# Patient Record
Sex: FEMALE | Race: WHITE | Employment: UNEMPLOYED | URBAN - NONMETROPOLITAN AREA
[De-identification: names, ages, dates, MRNs, and addresses within clinical notes are randomized per-mention and may not be internally consistent; named-entity substitution may affect disease eponyms.]

---

## 2017-10-13 ENCOUNTER — HOSPITAL ENCOUNTER (EMERGENCY)
Age: 42
Discharge: HOME | End: 2017-10-13
Payer: COMMERCIAL

## 2017-10-13 VITALS
RESPIRATION RATE: 16 BRPM | OXYGEN SATURATION: 99 % | HEART RATE: 77 BPM | BODY MASS INDEX: 21.23 KG/M2 | SYSTOLIC BLOOD PRESSURE: 125 MMHG | HEIGHT: 65 IN | DIASTOLIC BLOOD PRESSURE: 67 MMHG | TEMPERATURE: 98.5 F | WEIGHT: 127.4 LBS

## 2017-10-13 DIAGNOSIS — K11.20 PAROTITIS: ICD-10-CM

## 2017-10-13 DIAGNOSIS — R13.10 DYSPHAGIA, UNSPECIFIED TYPE: Primary | ICD-10-CM

## 2017-10-13 PROCEDURE — 99202 OFFICE O/P NEW SF 15 MIN: CPT | Performed by: NURSE PRACTITIONER

## 2017-10-13 PROCEDURE — 99215 OFFICE O/P EST HI 40 MIN: CPT

## 2017-10-13 PROCEDURE — 99283 EMERGENCY DEPT VISIT LOW MDM: CPT

## 2017-10-13 ASSESSMENT — ENCOUNTER SYMPTOMS
RHINORRHEA: 0
CHOKING: 0
NAUSEA: 0
EYE PAIN: 0
SINUS PAIN: 0
COUGH: 0
VOMITING: 0
BACK PAIN: 0
CHEST TIGHTNESS: 0
WHEEZING: 0
SHORTNESS OF BREATH: 0
SORE THROAT: 1
ABDOMINAL PAIN: 0
STRIDOR: 0
EYE DISCHARGE: 0
SINUS PRESSURE: 0
TROUBLE SWALLOWING: 1
FACIAL SWELLING: 0
DIARRHEA: 0
VOICE CHANGE: 0
RHINORRHEA: 1
APNEA: 0

## 2017-10-13 ASSESSMENT — PAIN SCALES - GENERAL
PAINLEVEL_OUTOF10: 8
PAINLEVEL_OUTOF10: 0

## 2017-10-13 ASSESSMENT — PAIN DESCRIPTION - ORIENTATION: ORIENTATION: LEFT

## 2017-10-13 ASSESSMENT — PAIN DESCRIPTION - PAIN TYPE: TYPE: ACUTE PAIN

## 2017-10-13 ASSESSMENT — PAIN DESCRIPTION - FREQUENCY: FREQUENCY: INTERMITTENT

## 2017-10-13 ASSESSMENT — PAIN DESCRIPTION - LOCATION: LOCATION: NECK

## 2017-10-13 NOTE — ED PROVIDER NOTES
[facility]  eMERGENCY dEPARTMENT eNCOUnter          CHIEF COMPLAINT       Chief Complaint   Patient presents with    Neck Pain     left side       Nurses Notes reviewed and I agree except as noted in the HPI. HISTORY OF PRESENT ILLNESS    Juanjo Villasenor is a 46 y.o. female who presents to the Emergency Department for the evaluation of neck pain. The patient states that she was laying in bed about 2 hours ago and all of sudden felt neck pain. She states that it hurt to swallow, talk, or even open her mouth wide. She states that the pain progressively got worse an caused her to have a headaches. She states that she has never experienced anything like this before. She states that it felt like something was stuck in her throat. She states that all of her symptoms have completely gone away and all she is experiencing is neck soreness. She denies weakness in face, tingling in face, vision changes, emesis, fever, shortness of breath, or any other symptoms at this time. The HPI was provided by the patient. REVIEW OF SYSTEMS     Review of Systems   Constitutional: Negative for appetite change, chills, fatigue and fever. HENT: Positive for ear pain, sore throat and trouble swallowing. Negative for congestion and rhinorrhea. Eyes: Negative for pain, discharge and visual disturbance. Respiratory: Negative for cough, shortness of breath and wheezing. Cardiovascular: Negative for chest pain, palpitations and leg swelling. Gastrointestinal: Negative for abdominal pain, diarrhea and vomiting. Genitourinary: Negative for difficulty urinating, dysuria and vaginal discharge. Musculoskeletal: Positive for neck pain. Negative for arthralgias, back pain and joint swelling. Skin: Negative for pallor and rash. Neurological: Positive for headaches. Negative for dizziness, syncope, weakness and light-headedness. Hematological: Negative for adenopathy.    Psychiatric/Behavioral: Negative for confusion, dysphoric mood and suicidal ideas. The patient is not nervous/anxious. PAST MEDICAL HISTORY    has a past medical history of Depression; Insomnia; Insomnia; and Weight loss. SURGICAL HISTORY      has a past surgical history that includes Tubal ligation; debbie and bso (cervix removed); other surgical history; and Dilation and curettage of uterus (4/29/16). CURRENT MEDICATIONS       Current Discharge Medication List      CONTINUE these medications which have NOT CHANGED    Details   ROPINIRole HCl (REQUIP PO) Take by mouth      citalopram (CELEXA) 40 MG tablet Take 40 mg by mouth daily      ibuprofen (ADVIL;MOTRIN) 600 MG tablet Take 1 tablet by mouth every 6 hours as needed for Pain  Qty: 60 tablet, Refills: 1             ALLERGIES     has No Known Allergies. FAMILY HISTORY     indicated that her mother is alive. She indicated that her father is alive. family history includes Diabetes in her mother. SOCIAL HISTORY      reports that she has been smoking Cigarettes. She has a 25.00 pack-year smoking history. She has never used smokeless tobacco. She reports that she drinks about 3.6 oz of alcohol per week . She reports that she uses drugs, including Marijuana. PHYSICAL EXAM     INITIAL VITALS:  height is 5' 5\" (1.651 m) and weight is 127 lb 6.4 oz (57.8 kg). Her oral temperature is 98.5 °F (36.9 °C). Her blood pressure is 125/67 and her pulse is 77. Her respiration is 16 and oxygen saturation is 99%. Physical Exam   Constitutional: She is oriented to person, place, and time. She appears well-developed and well-nourished. HENT:   Head: Normocephalic and atraumatic. Right Ear: External ear normal.   Left Ear: External ear normal.   Eyes: Conjunctivae are normal. Right eye exhibits no discharge. Left eye exhibits no discharge. No scleral icterus. Neck: Normal range of motion. Neck supple. No JVD present. Left carotid tenderness adjacent to the trachea.

## 2017-10-13 NOTE — ED NOTES
A Rena CNP calls report to LUISITO Rojas RN at [phone] regarding transfer     Shivam Biswas, CRISTOPHER  10/13/17 0155

## 2017-10-13 NOTE — ED PROVIDER NOTES
other surgical history; and Dilation and curettage of uterus (4/29/16). CURRENT MEDICATIONS       Current Discharge Medication List      CONTINUE these medications which have NOT CHANGED    Details   ROPINIRole HCl (REQUIP PO) Take by mouth      citalopram (CELEXA) 40 MG tablet Take 40 mg by mouth daily      ibuprofen (ADVIL;MOTRIN) 600 MG tablet Take 1 tablet by mouth every 6 hours as needed for Pain  Qty: 60 tablet, Refills: 1             ALLERGIES     Patient is has No Known Allergies. FAMILY HISTORY     Patient's family history includes Diabetes in her mother. SOCIAL HISTORY     Patient  reports that she has been smoking Cigarettes. She has a 25.00 pack-year smoking history. She has never used smokeless tobacco. She reports that she drinks about 3.6 oz of alcohol per week . She reports that she uses drugs, including Marijuana. PHYSICAL EXAM     ED TRIAGE VITALS  BP: 130/68, Temp: 98.6 °F (37 °C), Pulse: 83, Resp: 16, SpO2: 98 %  Physical Exam   Constitutional: She is oriented to person, place, and time. Vital signs are normal. She appears well-developed and well-nourished. Non-toxic appearance. She does not have a sickly appearance. She does not appear ill. No distress. HENT:   Head: Normocephalic and atraumatic. Right Ear: Hearing and external ear normal.   Left Ear: Hearing and external ear normal.   Nose: Nose normal.   Mouth/Throat: Uvula is midline and mucous membranes are normal. No oral lesions. There is trismus (mild) in the jaw. Normal dentition. No dental abscesses, uvula swelling or dental caries. Posterior oropharyngeal erythema present. No oropharyngeal exudate, posterior oropharyngeal edema or tonsillar abscesses. Neck: Trachea normal, normal range of motion, full passive range of motion without pain and phonation normal. Neck supple. No spinous process tenderness and no muscular tenderness present. No neck rigidity.  No tracheal deviation, no edema, no erythema and normal range of motion present. No thyromegaly present. Cardiovascular: Normal rate, regular rhythm, S1 normal, S2 normal and normal heart sounds. No murmur heard. Pulmonary/Chest: Effort normal and breath sounds normal. No accessory muscle usage or stridor. No respiratory distress. She has no decreased breath sounds. She has no wheezes. She has no rhonchi. She has no rales. Lymphadenopathy:        Head (right side): No submental, no submandibular, no tonsillar, no preauricular, no posterior auricular and no occipital adenopathy present. Head (left side): No submental, no submandibular, no tonsillar, no preauricular, no posterior auricular and no occipital adenopathy present. She has cervical adenopathy. Right cervical: No superficial cervical, no deep cervical and no posterior cervical adenopathy present. Left cervical: Deep cervical adenopathy present. No superficial cervical and no posterior cervical adenopathy present. Neurological: She is alert and oriented to person, place, and time. Skin: Skin is warm, dry and intact. No rash noted. She is not diaphoretic. No cyanosis. No pallor. Nursing note and vitals reviewed. DIAGNOSTIC RESULTS   Labs:No results found for this visit on 10/13/17. IMAGING:    URGENT CARE COURSE:     Vitals:    10/13/17 1403   BP: 130/68   Pulse: 83   Resp: 16   Temp: 98.6 °F (37 °C)   TempSrc: Temporal   SpO2: 98%   Weight: 127 lb 6.4 oz (57.8 kg)   Height: 5' 5\" (1.651 m)       Medications - No data to display  PROCEDURES:  None  FINAL IMPRESSION      1. Dysphagia, unspecified type        DISPOSITION/PLAN   DISPOSITION Decision to Transfer   Called report to [facility] at [facility] ER  Patient transferred by personal car, S.O taking for further evalouation  Acute onset 20 minutes of sore throat, left sided, left cervial adenopathy, severe pain, mild trismus, not wanting to swallow salivia due to pain.     No abscess, stones noted due oral cavity  Afebrile, nontoxic in appearance  Oral airway patient, own airway    PATIENT REFERRED TO:  [facility] EMERGENCY DEPT  [place] [id number] [phone]  Today        DISCHARGE MEDICATIONS:  Current Discharge Medication List        Current Discharge Medication List            Jessica Ackerman, [address] [place] [address], [facility]  10/13/17 7773

## 2019-07-20 ENCOUNTER — HOSPITAL ENCOUNTER (EMERGENCY)
Dept: GENERAL RADIOLOGY | Age: 44
Discharge: HOME | End: 2019-07-20

## 2019-07-20 ENCOUNTER — HOSPITAL ENCOUNTER (EMERGENCY)
Age: 44
Discharge: HOME | End: 2019-07-20
Payer: COMMERCIAL

## 2019-07-20 VITALS
TEMPERATURE: 97.9 F | HEIGHT: 65 IN | SYSTOLIC BLOOD PRESSURE: 111 MMHG | RESPIRATION RATE: 16 BRPM | HEART RATE: 90 BPM | WEIGHT: 114 LBS | DIASTOLIC BLOOD PRESSURE: 54 MMHG | OXYGEN SATURATION: 97 % | BODY MASS INDEX: 18.99 KG/M2

## 2019-07-20 DIAGNOSIS — M94.0 COSTOCHONDRITIS: ICD-10-CM

## 2019-07-20 DIAGNOSIS — M54.50 ACUTE RIGHT-SIDED LOW BACK PAIN WITHOUT SCIATICA: Primary | ICD-10-CM

## 2019-07-20 PROCEDURE — 99213 OFFICE O/P EST LOW 20 MIN: CPT

## 2019-07-20 PROCEDURE — 71046 X-RAY EXAM CHEST 2 VIEWS: CPT

## 2019-07-20 PROCEDURE — 99214 OFFICE O/P EST MOD 30 MIN: CPT | Performed by: NURSE PRACTITIONER

## 2019-07-20 RX ORDER — METHYLPREDNISOLONE 4 MG/1
TABLET ORAL
Qty: 1 KIT | Refills: 0 | Status: SHIPPED | OUTPATIENT
Start: 2019-07-20 | End: 2019-07-26

## 2019-07-20 RX ORDER — CYCLOBENZAPRINE HCL 10 MG
10 TABLET ORAL 3 TIMES DAILY PRN
Qty: 30 TABLET | Refills: 0 | Status: SHIPPED | OUTPATIENT
Start: 2019-07-20 | End: 2019-07-30

## 2019-07-20 ASSESSMENT — PAIN DESCRIPTION - ORIENTATION: ORIENTATION: UPPER;RIGHT

## 2019-07-20 ASSESSMENT — ENCOUNTER SYMPTOMS
SHORTNESS OF BREATH: 0
CHEST TIGHTNESS: 0
EYE ITCHING: 0
WHEEZING: 0
GASTROINTESTINAL NEGATIVE: 1

## 2019-07-20 ASSESSMENT — PAIN - FUNCTIONAL ASSESSMENT: PAIN_FUNCTIONAL_ASSESSMENT: ACTIVITIES ARE NOT PREVENTED

## 2019-07-20 ASSESSMENT — PAIN DESCRIPTION - PAIN TYPE: TYPE: ACUTE PAIN

## 2019-07-20 ASSESSMENT — PAIN SCALES - GENERAL: PAINLEVEL_OUTOF10: 8

## 2019-07-20 ASSESSMENT — PAIN DESCRIPTION - LOCATION: LOCATION: BACK

## 2019-07-20 ASSESSMENT — PAIN DESCRIPTION - DESCRIPTORS: DESCRIPTORS: DISCOMFORT

## 2019-07-20 NOTE — ED PROVIDER NOTES
Patient is has No Known Allergies. FAMILY HISTORY     Patient'sfamily history includes Diabetes in her mother. SOCIAL HISTORY     Patient  reports that she has been smoking cigarettes. She has a 25.00 pack-year smoking history. She has never used smokeless tobacco. She reports that she drinks about 6.0 standard drinks of alcohol per week. She reports that she has current or past drug history. Drug: Marijuana. PHYSICAL EXAM     ED TRIAGE VITALS  BP: (!) 111/54, Temp: 97.9 °F (36.6 °C), Pulse: 90, Resp: 16, SpO2: 97 %  Physical Exam   Constitutional: She is oriented to person, place, and time. She appears well-developed and well-nourished. No distress. HENT:   Head: Normocephalic. Nose: Nose normal.   Mouth/Throat: Oropharynx is clear and moist.   Eyes: Right eye exhibits no discharge. Left eye exhibits no discharge. No scleral icterus. Neck: Normal range of motion. Cardiovascular: Normal rate, regular rhythm, normal heart sounds and intact distal pulses. Pulmonary/Chest: Effort normal. She has decreased breath sounds in the right middle field and the right lower field. She has no wheezes. She has no rales. Abdominal: Soft. Bowel sounds are normal. She exhibits no distension. There is no tenderness. Musculoskeletal: Normal range of motion. She exhibits no edema or tenderness. Lymphadenopathy:     She has no cervical adenopathy. Neurological: She is alert and oriented to person, place, and time. Skin: Skin is warm and dry. Capillary refill takes less than 2 seconds. No rash noted. No erythema. Psychiatric: She has a normal mood and affect. Her behavior is normal. Judgment and thought content normal.   Nursing note and vitals reviewed. DIAGNOSTIC RESULTS   Labs: No results found for this visit on 07/20/19. IMAGING:  XR CHEST STANDARD (2 VW)   Final Result   1.  Unremarkable PA and lateral views of the chest.            **This report has been created using voice recognition

## 2019-07-20 NOTE — ED NOTES
All discharge education and information given. Pt instructed to go to ED for any increase in back pain. Verbalized Understanding. Left stable.      Lloyd Caba, CRISTOPHER  07/20/19 [id number]

## 2021-07-09 ENCOUNTER — HOSPITAL ENCOUNTER (OUTPATIENT)
Age: 46
Setting detail: SPECIMEN
Discharge: HOME | End: 2021-07-09

## 2021-07-09 LAB
ABSOLUTE EOS #: 0.1 K/UL (ref 0–0.44)
ABSOLUTE IMMATURE GRANULOCYTE: 0.03 K/UL (ref 0–0.3)
ABSOLUTE LYMPH #: 2.14 K/UL (ref 1.1–3.7)
ABSOLUTE MONO #: 0.71 K/UL (ref 0.1–1.2)
ALT SERPL-CCNC: 7 U/L (ref 5–33)
ANION GAP SERPL CALCULATED.3IONS-SCNC: 10 MMOL/L (ref 9–17)
AST SERPL-CCNC: 13 U/L
BASOPHILS # BLD: 1 % (ref 0–2)
BASOPHILS ABSOLUTE: 0.09 K/UL (ref 0–0.2)
BUN BLDV-MCNC: 5 MG/DL (ref 6–20)
BUN/CREAT BLD: ABNORMAL (ref 9–20)
CALCIUM SERPL-MCNC: 8.9 MG/DL (ref 8.6–10.4)
CHLORIDE BLD-SCNC: 105 MMOL/L (ref 98–107)
CHOLESTEROL/HDL RATIO: 2.4
CHOLESTEROL: 177 MG/DL
CO2: 26 MMOL/L (ref 20–31)
CREAT SERPL-MCNC: 0.66 MG/DL (ref 0.5–0.9)
DIFFERENTIAL TYPE: ABNORMAL
EOSINOPHILS RELATIVE PERCENT: 1 % (ref 1–4)
GFR AFRICAN AMERICAN: >60 ML/MIN
GFR NON-AFRICAN AMERICAN: >60 ML/MIN
GFR SERPL CREATININE-BSD FRML MDRD: ABNORMAL ML/MIN/{1.73_M2}
GFR SERPL CREATININE-BSD FRML MDRD: ABNORMAL ML/MIN/{1.73_M2}
GLUCOSE BLD-MCNC: 103 MG/DL (ref 70–99)
HCT VFR BLD CALC: 40.3 % (ref 36.3–47.1)
HDLC SERPL-MCNC: 75 MG/DL
HEMOGLOBIN: 11.8 G/DL (ref 11.9–15.1)
IMMATURE GRANULOCYTES: 0 %
LDL CHOLESTEROL: 88 MG/DL (ref 0–130)
LYMPHOCYTES # BLD: 27 % (ref 24–43)
MCH RBC QN AUTO: 28.1 PG (ref 25.2–33.5)
MCHC RBC AUTO-ENTMCNC: 29.3 G/DL (ref 28.4–34.8)
MCV RBC AUTO: 96 FL (ref 82.6–102.9)
MONOCYTES # BLD: 9 % (ref 3–12)
NRBC AUTOMATED: 0 PER 100 WBC
PDW BLD-RTO: 16.4 % (ref 11.8–14.4)
PLATELET # BLD: 374 K/UL (ref 138–453)
PLATELET ESTIMATE: ABNORMAL
PMV BLD AUTO: 11.2 FL (ref 8.1–13.5)
POTASSIUM SERPL-SCNC: 4 MMOL/L (ref 3.7–5.3)
RBC # BLD: 4.2 M/UL (ref 3.95–5.11)
RBC # BLD: ABNORMAL 10*6/UL
SEG NEUTROPHILS: 62 % (ref 36–65)
SEGMENTED NEUTROPHILS ABSOLUTE COUNT: 4.75 K/UL (ref 1.5–8.1)
SODIUM BLD-SCNC: 141 MMOL/L (ref 135–144)
TRIGL SERPL-MCNC: 72 MG/DL
TSH SERPL DL<=0.05 MIU/L-ACNC: 0.77 MIU/L (ref 0.3–5)
VLDLC SERPL CALC-MCNC: NORMAL MG/DL (ref 1–30)
WBC # BLD: 7.8 K/UL (ref 3.5–11.3)
WBC # BLD: ABNORMAL 10*3/UL

## 2022-04-13 ENCOUNTER — HOSPITAL ENCOUNTER (INPATIENT)
Age: 47
LOS: 4 days | Discharge: HOME | DRG: 751 | End: 2022-04-18
Attending: PSYCHIATRY & NEUROLOGY | Admitting: PSYCHIATRY & NEUROLOGY
Payer: MEDICAID

## 2022-04-13 DIAGNOSIS — S61.519A SELF-INFLICTED LACERATION OF WRIST, INITIAL ENCOUNTER (HCC): ICD-10-CM

## 2022-04-13 DIAGNOSIS — R45.851 DEPRESSION WITH SUICIDAL IDEATION: Primary | ICD-10-CM
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
## 2022-04-13 DIAGNOSIS — F32.A DEPRESSION WITH SUICIDAL IDEATION: Primary | ICD-10-CM

## 2022-04-13 DIAGNOSIS — F10.920 ACUTE ALCOHOLIC INTOXICATION WITHOUT COMPLICATION (HCC): ICD-10-CM

## 2022-04-13 LAB
ACETAMINOPHEN LEVEL: < 5 UG/ML (ref 0–20)
ALBUMIN SERPL-MCNC: 4.1 G/DL (ref 3.5–5.1)
ALP BLD-CCNC: 95 U/L (ref 38–126)
ALT SERPL-CCNC: 8 U/L (ref 11–66)
AMPHETAMINE+METHAMPHETAMINE URINE SCREEN: NEGATIVE
ANION GAP SERPL CALCULATED.3IONS-SCNC: 14 MEQ/L (ref 8–16)
AST SERPL-CCNC: 16 U/L (ref 5–40)
BACTERIA: ABNORMAL /HPF
BARBITURATE QUANTITATIVE URINE: NEGATIVE
BASOPHILS # BLD: 2 %
BASOPHILS ABSOLUTE: 0.2 THOU/MM3 (ref 0–0.1)
BENZODIAZEPINE QUANTITATIVE URINE: NEGATIVE
BILIRUB SERPL-MCNC: 0.2 MG/DL (ref 0.3–1.2)
BILIRUBIN DIRECT: < 0.2 MG/DL (ref 0–0.3)
BILIRUBIN URINE: NEGATIVE
BLOOD, URINE: NEGATIVE
BUN BLDV-MCNC: 7 MG/DL (ref 7–22)
CALCIUM SERPL-MCNC: 8.5 MG/DL (ref 8.5–10.5)
CANNABINOID QUANTITATIVE URINE: POSITIVE
CASTS 2: ABNORMAL /LPF
CASTS UA: ABNORMAL /LPF
CHARACTER, URINE: CLEAR
CHLORIDE BLD-SCNC: 105 MEQ/L (ref 98–111)
CO2: 24 MEQ/L (ref 23–33)
COCAINE METABOLITE QUANTITATIVE URINE: NEGATIVE
COLOR: YELLOW
CREAT SERPL-MCNC: 0.7 MG/DL (ref 0.4–1.2)
CRYSTALS, UA: ABNORMAL
EOSINOPHIL # BLD: 1.6 %
EOSINOPHILS ABSOLUTE: 0.1 THOU/MM3 (ref 0–0.4)
EPITHELIAL CELLS, UA: ABNORMAL /HPF
ERYTHROCYTE [DISTWIDTH] IN BLOOD BY AUTOMATED COUNT: 16.8 % (ref 11.5–14.5)
ERYTHROCYTE [DISTWIDTH] IN BLOOD BY AUTOMATED COUNT: 58.1 FL (ref 35–45)
ETHYL ALCOHOL, SERUM: 0.21 %
GFR SERPL CREATININE-BSD FRML MDRD: > 90 ML/MIN/1.73M2
GLUCOSE BLD-MCNC: 113 MG/DL (ref 70–108)
GLUCOSE URINE: NEGATIVE MG/DL
HCT VFR BLD CALC: 45.6 % (ref 37–47)
HEMOGLOBIN: 14.3 GM/DL (ref 12–16)
IMMATURE GRANS (ABS): 0.03 THOU/MM3 (ref 0–0.07)
IMMATURE GRANULOCYTES: 0.4 %
KETONES, URINE: NEGATIVE
LEUKOCYTE ESTERASE, URINE: NEGATIVE
LYMPHOCYTES # BLD: 30.8 %
LYMPHOCYTES ABSOLUTE: 2.3 THOU/MM3 (ref 1–4.8)
MCH RBC QN AUTO: 29.3 PG (ref 26–33)
MCHC RBC AUTO-ENTMCNC: 31.4 GM/DL (ref 32.2–35.5)
MCV RBC AUTO: 93.4 FL (ref 81–99)
MISCELLANEOUS 2: ABNORMAL
MONOCYTES # BLD: 6.7 %
MONOCYTES ABSOLUTE: 0.5 THOU/MM3 (ref 0.4–1.3)
NITRITE, URINE: NEGATIVE
NUCLEATED RED BLOOD CELLS: 0 /100 WBC
OPIATES, URINE: NEGATIVE
OSMOLALITY CALCULATION: 283.8 MOSMOL/KG (ref 275–300)
OXYCODONE: NEGATIVE
PH UA: 6 (ref 5–9)
PHENCYCLIDINE QUANTITATIVE URINE: NEGATIVE
PLATELET # BLD: 437 THOU/MM3 (ref 130–400)
PMV BLD AUTO: 10.1 FL (ref 9.4–12.4)
POTASSIUM SERPL-SCNC: 3.6 MEQ/L (ref 3.5–5.2)
PREGNANCY, SERUM: NEGATIVE
PROTEIN UA: 30
RBC # BLD: 4.88 MILL/MM3 (ref 4.2–5.4)
RBC URINE: ABNORMAL /HPF
RENAL EPITHELIAL, UA: ABNORMAL
SALICYLATE, SERUM: < 0.3 MG/DL (ref 2–10)
SARS-COV-2, NAAT: NOT  DETECTED
SEG NEUTROPHILS: 58.5 %
SEGMENTED NEUTROPHILS ABSOLUTE COUNT: 4.4 THOU/MM3 (ref 1.8–7.7)
SODIUM BLD-SCNC: 143 MEQ/L (ref 135–145)
SPECIFIC GRAVITY, URINE: 1 (ref 1–1.03)
TOTAL PROTEIN: 6.8 G/DL (ref 6.1–8)
TSH SERPL DL<=0.05 MIU/L-ACNC: 2.82 UIU/ML (ref 0.4–4.2)
UROBILINOGEN, URINE: 0.2 EU/DL (ref 0–1)
WBC # BLD: 7.5 THOU/MM3 (ref 4.8–10.8)
WBC UA: ABNORMAL /HPF
YEAST: ABNORMAL

## 2022-04-13 PROCEDURE — 84443 ASSAY THYROID STIM HORMONE: CPT

## 2022-04-13 PROCEDURE — 82077 ASSAY SPEC XCP UR&BREATH IA: CPT

## 2022-04-13 PROCEDURE — 87635 SARS-COV-2 COVID-19 AMP PRB: CPT

## 2022-04-13 PROCEDURE — 80179 DRUG ASSAY SALICYLATE: CPT

## 2022-04-13 PROCEDURE — 99285 EMERGENCY DEPT VISIT HI MDM: CPT

## 2022-04-13 PROCEDURE — 84703 CHORIONIC GONADOTROPIN ASSAY: CPT

## 2022-04-13 PROCEDURE — 36415 COLL VENOUS BLD VENIPUNCTURE: CPT

## 2022-04-13 PROCEDURE — 80143 DRUG ASSAY ACETAMINOPHEN: CPT

## 2022-04-13 PROCEDURE — 82248 BILIRUBIN DIRECT: CPT

## 2022-04-13 PROCEDURE — 81001 URINALYSIS AUTO W/SCOPE: CPT

## 2022-04-13 PROCEDURE — 85025 COMPLETE CBC W/AUTO DIFF WBC: CPT

## 2022-04-13 PROCEDURE — 80053 COMPREHEN METABOLIC PANEL: CPT

## 2022-04-13 PROCEDURE — 80307 DRUG TEST PRSMV CHEM ANLYZR: CPT

## 2022-04-13 ASSESSMENT — SLEEP AND FATIGUE QUESTIONNAIRES
AVERAGE NUMBER OF SLEEP HOURS: 2
DO YOU USE A SLEEP AID: YES
DIFFICULTY FALLING ASLEEP: YES
DO YOU HAVE DIFFICULTY SLEEPING: YES
RESTFUL SLEEP: NO
DIFFICULTY ARISING: YES
SLEEP PATTERN: INSOMNIA
DIFFICULTY STAYING ASLEEP: YES

## 2022-04-14 PROBLEM — F32.A DEPRESSION WITH SUICIDAL IDEATION: Status: ACTIVE | Noted: 2022-04-14

## 2022-04-14 PROBLEM — F33.9 MAJOR DEPRESSION, RECURRENT (HCC): Status: ACTIVE | Noted: 2022-04-14

## 2022-04-14 PROBLEM — R45.851 DEPRESSION WITH SUICIDAL IDEATION: Status: ACTIVE | Noted: 2022-04-14

## 2022-04-14 LAB — ETHYL ALCOHOL, SERUM: < 0.01 %

## 2022-04-14 PROCEDURE — APPSS30 APP SPLIT SHARED TIME 16-30 MINUTES: Performed by: PHYSICIAN ASSISTANT

## 2022-04-14 PROCEDURE — 1240000000 HC EMOTIONAL WELLNESS R&B

## 2022-04-14 PROCEDURE — 6370000000 HC RX 637 (ALT 250 FOR IP): Performed by: PSYCHIATRY & NEUROLOGY

## 2022-04-14 PROCEDURE — 82077 ASSAY SPEC XCP UR&BREATH IA: CPT

## 2022-04-14 PROCEDURE — 90792 PSYCH DIAG EVAL W/MED SRVCS: CPT | Performed by: PSYCHIATRY & NEUROLOGY

## 2022-04-14 PROCEDURE — 6370000000 HC RX 637 (ALT 250 FOR IP): Performed by: PHYSICIAN ASSISTANT

## 2022-04-14 RX ORDER — ATOMOXETINE 25 MG/1
25 CAPSULE ORAL DAILY
COMMUNITY

## 2022-04-14 RX ORDER — MAGNESIUM HYDROXIDE/ALUMINUM HYDROXICE/SIMETHICONE 120; 1200; 1200 MG/30ML; MG/30ML; MG/30ML
30 SUSPENSION ORAL EVERY 6 HOURS PRN
Status: DISCONTINUED | OUTPATIENT
Start: 2022-04-14 | End: 2022-04-18 | Stop reason: HOSPADM

## 2022-04-14 RX ORDER — ATOMOXETINE 25 MG/1
25 CAPSULE ORAL DAILY
Status: DISCONTINUED | OUTPATIENT
Start: 2022-04-14 | End: 2022-04-18 | Stop reason: HOSPADM

## 2022-04-14 RX ORDER — ACETAMINOPHEN 325 MG/1
650 TABLET ORAL EVERY 4 HOURS PRN
Status: DISCONTINUED | OUTPATIENT
Start: 2022-04-14 | End: 2022-04-18 | Stop reason: HOSPADM

## 2022-04-14 RX ORDER — OXYBUTYNIN CHLORIDE 10 MG/1
10 TABLET, EXTENDED RELEASE ORAL DAILY
COMMUNITY

## 2022-04-14 RX ORDER — CHLORDIAZEPOXIDE HYDROCHLORIDE 10 MG/1
10 CAPSULE, GELATIN COATED ORAL 3 TIMES DAILY
Status: DISCONTINUED | OUTPATIENT
Start: 2022-04-14 | End: 2022-04-18

## 2022-04-14 RX ORDER — ROPINIROLE 1 MG/1
2 TABLET, FILM COATED ORAL NIGHTLY
Status: DISCONTINUED | OUTPATIENT
Start: 2022-04-14 | End: 2022-04-18 | Stop reason: HOSPADM

## 2022-04-14 RX ORDER — OXYBUTYNIN CHLORIDE 10 MG/1
10 TABLET, EXTENDED RELEASE ORAL DAILY
Status: DISCONTINUED | OUTPATIENT
Start: 2022-04-14 | End: 2022-04-18 | Stop reason: HOSPADM

## 2022-04-14 RX ORDER — LIDOCAINE HYDROCHLORIDE 10 MG/ML
INJECTION, SOLUTION INFILTRATION; PERINEURAL
Status: DISPENSED
Start: 2022-04-14 | End: 2022-04-14

## 2022-04-14 RX ORDER — CITALOPRAM 40 MG/1
40 TABLET ORAL DAILY
Status: DISCONTINUED | OUTPATIENT
Start: 2022-04-14 | End: 2022-04-18 | Stop reason: HOSPADM

## 2022-04-14 RX ORDER — IBUPROFEN 200 MG
400 TABLET ORAL EVERY 6 HOURS PRN
Status: DISCONTINUED | OUTPATIENT
Start: 2022-04-14 | End: 2022-04-18 | Stop reason: HOSPADM

## 2022-04-14 RX ORDER — QUETIAPINE FUMARATE 25 MG/1
50 TABLET, FILM COATED ORAL NIGHTLY
Status: DISCONTINUED | OUTPATIENT
Start: 2022-04-14 | End: 2022-04-18 | Stop reason: HOSPADM

## 2022-04-14 RX ORDER — HYDROXYZINE HYDROCHLORIDE 25 MG/1
50 TABLET, FILM COATED ORAL 3 TIMES DAILY PRN
Status: DISCONTINUED | OUTPATIENT
Start: 2022-04-14 | End: 2022-04-18 | Stop reason: HOSPADM

## 2022-04-14 RX ORDER — TRAZODONE HYDROCHLORIDE 50 MG/1
50 TABLET ORAL NIGHTLY PRN
Status: DISCONTINUED | OUTPATIENT
Start: 2022-04-14 | End: 2022-04-18 | Stop reason: HOSPADM

## 2022-04-14 RX ADMIN — ATOMOXETINE 25 MG: 25 CAPSULE ORAL at 13:24

## 2022-04-14 RX ADMIN — CITALOPRAM 40 MG: 40 TABLET, FILM COATED ORAL at 13:24

## 2022-04-14 RX ADMIN — ROPINIROLE HYDROCHLORIDE 2 MG: 1 TABLET, FILM COATED ORAL at 20:37

## 2022-04-14 RX ADMIN — QUETIAPINE FUMARATE 50 MG: 25 TABLET ORAL at 20:37

## 2022-04-14 RX ADMIN — CHLORDIAZEPOXIDE HYDROCHLORIDE 10 MG: 10 CAPSULE ORAL at 13:24

## 2022-04-14 RX ADMIN — CHLORDIAZEPOXIDE HYDROCHLORIDE 10 MG: 10 CAPSULE ORAL at 08:38

## 2022-04-14 RX ADMIN — OXYBUTYNIN CHLORIDE 10 MG: 10 TABLET, EXTENDED RELEASE ORAL at 13:24

## 2022-04-14 RX ADMIN — CHLORDIAZEPOXIDE HYDROCHLORIDE 10 MG: 10 CAPSULE ORAL at 20:37

## 2022-04-14 ASSESSMENT — SLEEP AND FATIGUE QUESTIONNAIRES
SLEEP PATTERN: INSOMNIA
DIFFICULTY STAYING ASLEEP: YES
DIFFICULTY ARISING: YES
RESTFUL SLEEP: YES
DIFFICULTY FALLING ASLEEP: YES
DO YOU HAVE DIFFICULTY SLEEPING: YES
DO YOU USE A SLEEP AID: YES
AVERAGE NUMBER OF SLEEP HOURS: 8

## 2022-04-14 ASSESSMENT — PATIENT HEALTH QUESTIONNAIRE - PHQ9: SUM OF ALL RESPONSES TO PHQ QUESTIONS 1-9: 6

## 2022-04-14 ASSESSMENT — LIFESTYLE VARIABLES: HISTORY_ALCOHOL_USE: YES

## 2022-04-14 ASSESSMENT — PAIN SCALES - GENERAL
PAINLEVEL_OUTOF10: 0
PAINLEVEL_OUTOF10: 0
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
## 2022-04-14 NOTE — ED NOTES
Pt continues to lay in bed with no s/s of distress noted. Security monitoring.      Casandra Kirkland RN  04/14/22 9043

## 2022-04-14 NOTE — PLAN OF CARE
Patient has not attended any of the groups today and has not been out of her room to socialize with others this shift so she has not met her socialization goal. Encourage patient to attend all groups daily and to come out of her room to socialize with others during her hospital stay.

## 2022-04-14 NOTE — ED NOTES
Pt's , Yaz Hammonds, returned pt's call. Portable phone provided to pt to speak with . Pt laying down again. Updated on plan of care. Security continues to monitor.      Luma Aguiar RN  04/14/22 1650

## 2022-04-14 NOTE — PROGRESS NOTES
BAL 24 Hour Re-Assess:   Status & Exam & Behavior Support Service Tabs      Present Suicidal Behavior:      Verbal: Denies    Plan: Denies    Current Suicide Risk: Low, Moderate or High: Moderate      Present Homicidal Behavior:    Verbal: Denies    Plan: Denies      Psychosis:    Hallucinations: Denies    Delusions: Denies      Clinical Re-Assessment Summary including Current Mental State of Patient:     Patient was escorted to [address] [place] [address] by DARLENE SANTANA [facility] Dept under [facility] status. Patient presented with . 21 BAL. Patient states she was in an argument with her  and daughter concerning her alcohol issues. Patient reports she was upset during the argument. Patient did attempt to remove a tattoo from her wrist with a knife. Patient provides little information during reassessment. Patient reports active services with [facility]. Patient denies delusions/hallucinations. Patient reports issues with sleep. See Progress Note for additional information. Updated Level of Care Disposition:   Consulted with Joanna Wells CNP concerning the mental status of patient. Patient is referred to inpatient care for mental health/substance use treatment. Consulted with Dr. Yue Young concerning the mental status of patient. Patient is admitted to the care of Dr. Yue Young under [facility] status for mental health/substance use treatment.

## 2022-04-14 NOTE — ED PROVIDER NOTES
Patient was signed out to me by Zoey Chisholm CNP at end of shift pending return call from psychiatry. She presented after making suicidal threats with self-inflicted laceration which was repaired by previous provider. She was evaluated by previous provider and noted to be medically stable after period of observation for sobriety as she presented with elevated alcohol level. She remained stable during her ED stay, had no medical needs other than the laceration repair. Psychiatry was paged after previous providers medical clearance and she was signed out to me awaiting return call with the anticipated plan of admission as the patient is under [facility]. Psychiatry was also agreeable with admission for further psychiatric treatment and the patient had no further needs during my care. 1. Depression with suicidal ideation    2. Acute alcoholic intoxication without complication ([facility] [address]    3.  Self-inflicted laceration of wrist, initial encounter ([facility] [address]           Todd Mason PA-C  04/14/22 8502

## 2022-04-14 NOTE — PROGRESS NOTES
Psychosocial Assessment    Current Level of Psychosocial Functioning     Independent      XXX  Dependent    Minimal Assist     XXX     Comments:      Psychosocial High Risk Factors (check all that apply)    Unable to obtain meds   Chronic illness/pain    Substance abuse       XXX Alcoholism  Lack of Family Support   Financial stress   Isolation   Inadequate Community Resources  Suicide attempt(s)  Not taking medications        Victim of crime   Developmental Delay  Unable to manage personal needs    Age 74 or older   Homeless  No transportation   Readmission within 30 days  Unemployment  Traumatic Event  Grief        XXX    Family/Supports identified:      daughter       Sexual Orientation:       Heterosexual    Patient Strengths:      Support, housing, willingness to consider help    Patient Barriers:       Not connected with treatment, lacks education about her alcoholism. Safety plan:       Contracts for safety    [facility]HC/ history:      None    Plan of Care:  medication management, group/individual therapies, family meetings, psycho -education, treatment team meetings to assist with stabilization    Initial Discharge Plan:  Refer for addiction treatment and Alcoholics Anonymous. Clinical Summary:  Noemi Gilbert is a  42year old female with  A history of alcohol abuse leading to conflicts in her home between her  and daughter over her use of alcohol. This is the precipitating problem leading to this admission. BAL . 21. This is her second marriage and they have been  since December. Pt endorses daily alcohol use for 3 years, drinking unknown amounts due to she having frequent blackouts. Pt stated that her alcohol use increased following the death of her father 3 years ago. She has also had other significant losses in the past year. Her family is strongly positive for alcoholism in her father and both of her sisters.  She endorses loss of control each time she drinks, attempts to hide and conceal her alcohol use. Pt identifies ways in which her alcohol use has adversely impacted most all areas of her life. She has never had a history of mental health or addiction treatment. Denies suicidal ideation.

## 2022-04-14 NOTE — PROGRESS NOTES
[facility]  Initial Interdisciplinary Treatment Plan NOTE    Review Date & Time: 04/14/22  1510    Patient was in treatment team.  See Multidisciplinary Treatment Team sheet for participants. Admission Type:    [facility]     Reason for admission:    Conflict with Spouse and daughter over her alcohol use. Estimated Length of Stay Update:  04/16/22  Estimated Discharge Date Update: 3-5 days    PATIENT STRENGTHS:  Patient Strengths Strengths: Connection to output provider  Patient Strengths and Limitations:Limitations: Inappropriate/potentially harmful leisure interests  Addictive Behavior:   Medical Problems:  Past Medical History:   Diagnosis Date    Depression     Insomnia     Insomnia     Weight loss        EDUCATION:   Learner Progress Toward Treatment Goals: Reviewed results and recommendations of this team, Reviewed group plan and strategies, Reviewed signs, symptoms and risk of self harm and violent behavior and Reviewed goals and plan of care    Method: Individual    Outcome: Verbalized understanding, Demonstrated Understanding and Needs reinforcement    PATIENT GOALS: To stop drinking alcohol    PLAN/TREATMENT RECOMMENDATIONS UPDATE:   1. What is the most important thing we can help you with while you are here? See above  2. Who is your support system? Family  3. Do you have follow-up providers? None. Suggested a referral and AA to pt. 4. Do you have the ability to pay for your medications? [employer]  5. Where will you be residing when you leave the hospital? With   6. Will need a return to work slip or FMLA paper completion?  No      GOALS UPDATE:   Time frame for Short-Term Goals: Daily    SCOTT Preciado

## 2022-04-14 NOTE — PROGRESS NOTES
Behavioral Services  Medicare Certification Upon Admission    I certify that this patient's inpatient psychiatric hospital admission is medically necessary for:    [x] (1) Treatment which could reasonably be expected to improve this patient's condition,       [x] (2) Or for diagnostic study;     AND     [x](2) The inpatient psychiatric services are provided while the individual is under the care of a physician and are included in the individualized plan of care.     Estimated length of stay/service 3-5 days    Plan for post-hospital care hc    Electronically signed by Raysa Pepe MD on 4/14/2022 at 7:50 AM

## 2022-04-14 NOTE — ED TRIAGE NOTES
Pt arrives via EMS for c/o suicidal. Per EMS, a family member called EMS because pt was attempting to cut a tattoo off of her left wrist. Family also stated pt was making suicidal threats. Pt denies suicidal ideations at this time.

## 2022-04-14 NOTE — BH NOTE
INPATIENT RECREATIONAL THERAPY  ADULT BEHAVIORAL SERVICES  EVALUATION    REFERRING PHYSICIAN:   Dr. Ananda Faulkner  DIAGNOSIS:   Major Depression, Recurrent  PRECAUTIONS:    Standard precautions    HISTORY OF PRESENT ILLNESS/INJURY:   Patient was admitted to the unit due to suicidal ideation and depression. Patient cut her wrist with a knife that required sutures prior to admission. Patient had been abusing alcohol and had gotten into an argument with her  and her daughter over her drinking. Patient reported marijuana use as well. Patient stated that she has had depression due to her mother and her aunt both passing away within the past 4 months. Patient appeared anxious and irritable and isolating in her room. PMH:  Please see medical chart for prior medical history, allergies, and medication    HISTORY OF PSYCHIATRIC TREATMENT:  No previous psych treatment    DATE OF BIRTH:   [birth year]  GENDER:   female  MARITAL STATUS:    with children  EMPLOYMENT STATUS:  unemployed    LIVING SITUATION/SUPPORT:  Lives with her  and her adult [de-identified]. EDUCATIONAL LEVEL:   graduate    MEDICATION/DRUG USE:  Alcohol abuse. Marijuana use. LEISURE INTERESTS:  family activities  ACTIVITY PREFERENCE:    Individual at this time - isolating in room  ACTIVITY TYPES:    Passive. Indoor. Outdoor. Active. COGNITION:   A&Ox4    COPING:   Poor coping skills  ATTENTION:  Poor concentration  RELAXATION:  Patient reported poor sleep and anxiety. SELF-ESTEEM:   poor  MOTIVATION:   Poor - no insight    SOCIAL SKILLS:  Fair - guarded and isolating in her room. FRUSTRATION TOLERANCE:   Poor - cut her arm prior to admission.    ATTENTION SEEKING:  Isolating in room/cut herself prior to admission  COOPERATION:   Guarded but cooperative   AFFECT:  Flat - tearful  APPEARANCE:  appropriate    HEARING:   No problems noted  VISION:   No problems noted    VERBAL COMMUNICATION:   No problems noted  WRITTEN COMMUNICATION:   No problems noted    COORDINATION:    No problems noted  MOBILITY:  Ambulates independently   GOALS:   Identify 2 new positive coping skills by time of discharge.

## 2022-04-14 NOTE — ED NOTES
Pt used ED portable phone to call and updated a family member at this time. Denies needs or pain. Updated on plan of care. Security monitoring.      Vic Astorga RN  04/13/22 9606

## 2022-04-14 NOTE — H&P
Department of Psychiatry  Psychiatric Assessment   Reason for Admission to Psychiatric Unit:  Threat to self requiring 24 hour professional observation  Concerns about patient's safety in the community    CHIEF COMPLAINT:  Suicide attempt    HISTORY OF PRESENT ILLNESS:      Roxanna Acosta is a 42 y.o. female with a history of alcohol abuse and depression who presented to the emergency department by DARLENE SANTANA JORGE LUIS [facility] under [facility] status following a suicide attempt by cutting herself. .   Per the [facility] AN AFFILIATE OF [facility] note: \"Patient presented with . 21 BAL. Patient states she was in an argument with her  and daughter concerning her alcohol issues. Patient reports she was upset during the argument. Patient did attempt to remove a tattoo from her wrist with a knife. Patient provides little information during reassessment. Patient reports active services with [facility]. Patient denies delusions/hallucinations. Patient reports issues with sleep. \"    Pauly Ovalle reports she was drinking and got into an argument with her  and daughter. She then says \"I tried to take my dad's name off\" referring to her trying to cut her tattoo off of her arm. When asked why she was trying to cut the tattoo off, she says Angi Bose was a drunk and made me a drunk. \"  She adamantly denies that this was a suicide attempt. She also denies any recent suicidal ideation. When asked about the suicidal statements she made to her  prior to cutting herself, she denies this and says she does not remember making those statements. When asked about recent stressors, patient reports her mother's sister who raised her  a month ago. She also reports her mother  on  of last year and her fatherdied 3 years ago. She reports she has not really been feeling depressed lately, she says it has been more anger. She feels angry and on edge all the time. She says she has been verbally lashing out on her family.   She reports her depression has been stable which she attributes to taking Celexa daily. Denies feeling down and sad for days than not. She reports she has trouble falling and staying asleep. She takes Seroquel nightly for this. 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 She gets about 8 or 9 hours as long she does not have any trouble falling asleep. She feels rested most days when she wakes up in the morning. Her energy has been good throughout the day. Denies any issues with motivation. She says she normally spends her day doing things around the home. Her appetite has been okay. She has been feeling worthless but denies feeling hopeless or helpless. Quinn Jordan is somnolent but arouses to verbal stimuli and is cooperative with the interview. She denies and minimizes the events that led to her admission. She does become tearful when told that she is not going to be discharged today given the severity of her cutting herself prior to admission. She continues to feel depressed today. She denies any active suicidal ideation during the interview and contracts for safety on the unit. She denies any hallucinations. No evidence of delusions or overt psychosis on examination. She denies any active alcohol withdrawal symptoms at this time. I spoke with the patient's  Darrion Meléndez ([phone]) after obtaining the patient's verbal consent. Darrion Meléndez said Meryle Birchwood has been drinking alcohol for so long and so bad that she almost lives in a different reality. Darrion Meléndez said Meryle Birchwood lies about everything and becomes angry when they confront her about lieing. Darrion Meléndez said that Meryle Birchwood promised him and her children yesterday that she would stop drinking. Darrion Meléndez said they have been constantly getting into arguments about Dana's drinking with her. They have been trying to get her help with her alcohol abuse but she refuses to do so. Darrion Meléndez said Meryle Birchwood is a nonfunctioning alcoholic.  Darrion Meléndez any  · Home Medication Compliance: good per patient    Past psychiatric medications includes:     Strattera, Seroquel, Celexa    Adverse reactions from psychotropic medications:    no      Past Medical History:        Diagnosis Date    Depression     Insomnia     Insomnia     Weight loss        Past Surgical History:        Procedure Laterality Date    DILATION AND CURETTAGE OF UTERUS  4/29/16    Hysteroscopy, with myosure    OTHER SURGICAL HISTORY      patient denies HARPER    HARPER AND BSO      TUBAL LIGATION         Medications Prior to Admission:   Medications Prior to Admission: atomoxetine (STRATTERA) 25 MG capsule, Take 25 mg by mouth daily  oxybutynin (DITROPAN-XL) 10 MG extended release tablet, Take 10 mg by mouth daily  ROPINIRole HCl (REQUIP PO), Take by mouth  citalopram (CELEXA) 40 MG tablet, Take 40 mg by mouth daily  ibuprofen (ADVIL;MOTRIN) 600 MG tablet, Take 1 tablet by mouth every 6 hours as needed for Pain (Patient not taking: Reported on 4/13/2022)    Allergies:  Patient has no known allergies. Social History:   [employer] [place] [employer] [place]  · RESIDENCE:  Currently lives in [facility] [place] [facility] with her , 24year old daughter and granddaughter  · LEVEL OF EDUCATION: graduated high school  · MARITAL STATUS:  to her  since December. They have been together for 3 years  · CHILDREN: 2 daughters  · OCCUPATION: Currently unemployed.  Has not worked for a few months  · PATIENT ASSETS: family supportive     DRUG USE HISTORY  Social History     Tobacco Use   Smoking Status Current Every Day Smoker    Packs/day: 1.00    Years: 25.00    Pack years: 25.00    Types: Cigarettes   Smokeless Tobacco Never Used     Social History     Substance and Sexual Activity   Alcohol Use Yes    Alcohol/week: 14.0 standard drinks    Types: 14 Cans of beer per week     Social History     Substance and Sexual Activity   Drug Use Yes    Types: Marijuana (Dey Latrice)     Patient reports she has been drinking alcohol every day as much as she can. She drinks both liquor and beer. She reports she \"loves to blackout. \"  She denies any tremoring when she stops drinking. Denies any history of withdrawal from alcohol. She has never been to inpatient rehabilitation or admitted for inpatient detoxification from alcohol. She does endorse that her alcohol use increased after her dad passed away 3 years ago. He was also an alcoholic    Patient smokes marijuana daily. UDS positive for marijuana only. LEGAL HISTORY:   HISTORY OF INCARCERATION: no  Got a DUI years ago    Family Psychiatric and Medical History:   \"whole family\" has mental illness  denies suicides in family  Sisters and maternal cousins illicit drug use in family        Problem Relation Age of Onset    Diabetes Mother          Lifetime Psychiatric Review of Systems      ·    Obsessions and Compulsions: denies  ·    Enedelia or Hypomania: denies  ·    Hallucinations: denies  ·    Panic Attacks:  denies   ·    Delusions:  denies  ·    Phobias: denies       Medical Review of Systems:     Constitutional: Negative for appetite change, diaphoresis, fatigue and fever. HENT: Negative for congestion, sore throat and tinnitus. Eyes: Negative for visual disturbance. Respiratory: Negative for cough, shortness of breath and wheezing. Cardiovascular: Negative for chest pain and leg swelling. Gastrointestinal: Negative for nausea, vomiting, diarrhea. Negative for abdominal pain. Genitourinary: Negative for frequency. Musculoskeletal: Negative for arthralgias, myalgias and neck stiffness. Skin: Negative for puritis. Neurological: Negative for dizziness, weakness and headaches. All other systems reviewed and are negative.       PHYSICAL EXAM:  Vitals:  /76   Pulse 76   Temp 98.2 °F (36.8 °C) (Oral)   Resp 16   Ht 5' 5\" (1.651 m)   Wt 114 lb (51.7 kg)   LMP 04/06/2022   SpO2 97%   BMI 18.97 kg/m²     Pain Level: no pain reported       Physical Exam:    Constitutional: Well developed, well nourished, no acute distress  Eyes: Pupils round and reactive to light bilaterally  Neck:  Supple, no thyromegaly. Cardiovascular:  Normal rate and rhythm, normal S1 and S2. No murmur or gallop on auscultation. Radial pulses 2+ and brisk bilaterally  Lungs: Clear to auscultation bilaterally without wheezing or rales. Musculoskeletal:  Full range of motion in all four extremities. Neurologic:  Cranial nerves II through XII are grossly intact. Normal gait and station. Skin: patient has a 4cm laceration that was repaired with simple interrupted sutures on her left lower arm. Appears to be healing well.  No signs of infection noted        Mental Status Examination:    Level of consciousness:  Awake   Appearance:  well-appearing, hospital attire, lying in bed, fair grooming and fair hygiene  Behavior/Motor:  Tearful at times  Attitude toward examiner:  cooperative, attentive and fair eye contact  Speech:  spontaneous, normal rate and normal volume  Mood:  dysphoric  Affect:  blunted  Thought processes:  linear, goal directed and coherent  Thought content:  Denies homicidal ideation  Suicidal Ideation:  Denies active suicidal ideation  Delusions:  no evidence of delusions  Perceptual Disturbance:  denies any perceptual disturbance  Cognition: Patient is oriented to person, place, time and situation  Concentration: clinically adequate  Memory: intact  Insight & Judgement: poor        DSM-5 DIAGNOSIS:    Depression with suicidal ideation  Alcohol use disorder, severe, dependence  Cannabis use disorder, severe, dependence  Rule out substance induced mood disorder    Patient Active Problem List   Diagnosis    Depression    Insomnia    Depression with suicidal ideation          Psychosocial and Contextual Factors:   Substance use  Occupational  Relationship  Living situation    Past Medical History:   Diagnosis Date    Depression     Insomnia     Insomnia     Weight loss         Goals:    Reviewed labs  Reviewed EKG  Will obtain records and review them today. Medication adjustment as discussed with the attending physician: I verified her medications with [facility] pharmacy. Will resume home medications as prescribed and adjust accordingly. Consults: none  Encouraged patient to engage in groups, milieu, and individual therapies offered as part of programing. Behavioral Services  Medicare Certification Upon Admission    I certify that this patient's inpatient psychiatric hospital admission is medically necessary for:   X (1) Treatment which could reasonably be expected to improve this patient's condition,      X (2) Or for diagnostic study;     AND     X (2) The inpatient psychiatric services are provided while the individual is under the care of a physician and are included in the individualized plan of care. Estimated length of stay/service: Greater than two midnights will be required to reach therapeutic levels of medications and to stabilize mood    Plan for post-hospital care: Follow up with outpatient psychiatric services    Electronically signed by Nahid Olivo PA-C on 4/14/2022 at 1:47 PM      **This report has been created using voice recognition software. It may contain minor errors which are inherent in voice recognition technology. **                                     Psychiatry Attending Attestation     I assessed this patient and reviewed the case and plan of care with Nahid Olivo PA-C. I have reviewed the above documentation and I agree with the findings and treatment plan with the following updates. Patient was evaluated by Nahid Olivo PA-C on the unit in person and I evaluated patient as Tele visit. Patient is a 79-year-old female with history of alcohol abuse admitted on a [facility] after she reported suicidal thoughts with a  and cut on her wrist.  Patient has been very dismissive during the interview.   She did report that she has been dealing with depression since her parents passed away recently. Endorses anhedonia. Reports some conflicts at home. Reports that she has been drinking excessively for the last several years now. Could not remember any period of sobriety last 3 years. Reports constant feelings of helplessness and hopelessness. Adamantly denying any suicidal thoughts however appears to be minimizing the incidents that led to her admission. TREATMENT PLAN    Risk Management:  close watch per standard protocol      Psychotherapy:  participation in milieu and group and individual sessions with Attending Physician,  and Physician Assistant/CNP      Estimated length of stay: It might take more than 2 midnights to stabilize patient's mood/thoughts and titrate medications to effect. GENERAL PATIENT/FAMILY EDUCATION  Patient will understand basic signs and symptoms, Patient will understand benefits/risks and potential side effects from proposed meds and Patient will understand their role in recovery. Family is  active in patient's care. Patient assets that may be helpful during treatment include: Intent to participate and engage in treatment, sufficient fund of knowledge and intellect to understand and utilize treatments. Risk level: High     Goals:    Reviewed labs  Will obtain records/collateral information and review them today. Medication adjustment: We will restart her home medications and titrate to effect  Will start Librium to help with withdrawal symptoms  Consults: None      Shazia Jarrett is a 42 y.o. female being evaluated by a Virtual Visit (video visit) encounter to address concerns as mentioned above. A caregiver was present in the room along with the patient. Patient is present at [address] [place] [address] and I am physically present at my home in [facility]     This Virtual Visit was conducted with patient's consent.  The patient is located in a state where I am licensed to provide care. --Amber Hernandez MD on 4/14/2022 at 2:59 PM    An electronic signature was used to authenticate this note. **This report has been created using voice recognition software. It may contain minor errors which are inherent in voice recognition technology. **

## 2022-04-14 NOTE — ED NOTES
Patient placed in safe room that is ligature resistant with continuous monitoring in place. Provider notified, requested an assessment by behavioral health . Patient belongings secured in a locked lockers outside of the room. Explained suicide prevention precautions to the patient including constant observer.       José Miguel Jacobson RN  04/13/22 [id number]

## 2022-04-14 NOTE — ED NOTES
Pt laying in bed trying to sleep. Updated on plan of care. Security continues to monitor.      Prabhu Chapin  04/14/22 73 Inés Encarnacion RN  04/14/22 [id number]

## 2022-04-14 NOTE — ED NOTES
Provider at bedside suturing wounds. Pt provided with portable phone again to call her  and left a voicemail for him to call back. Security continues to monitor.      Gonzalo Tenorio RN  04/14/22 [id number]

## 2022-04-14 NOTE — ED NOTES
Pt updated on plan of care. Water provided per request. Doreen Pittman continues to monitor.      José Miguel Jacobson RN  04/14/22 0500

## 2022-04-14 NOTE — BH NOTE
Behavioral Health   Admission Note     Admission Type:   Admission Type: Involuntary    Reason for admission:  Reason for Admission: \"Being stupid\"    PATIENT STRENGTHS:  Strengths: Communication,Connection to output provider,Positive Support    Patient Strengths and Limitations:  Limitations: Inappropriate/potentially harmful leisure interests    Addictive Behavior:   Addictive Behavior  In the past 3 months, have you felt or has someone told you that you have a problem with:  : None  Do you have a history of Chemical Use?: No  Do you have a history of Alcohol Use?: Yes  Do you have a history of Street Drug Abuse?: Yes  Histroy of Prescripton Drug Abuse?: No    Medical Problems:   Past Medical History:   Diagnosis Date    Depression     Insomnia     Insomnia     Weight loss        Status EXAM:  Status and Exam  Normal: No  Facial Expression: Sad  Affect: Blunt  Level of Consciousness: Alert  Mood:Normal: No  Mood: Depressed,Sad,Anxious  Motor Activity:Normal: Yes  Interview Behavior: Cooperative  Preception: Rhome to Person,Rhome to Time,Rhome to Place,Rhome to Situation  Attention:Normal: No  Attention: Unable to Concentrate  Thought Processes: Tangential  Thought Content:Normal: Yes  Hallucinations: None  Delusions: No  Memory:Normal: Yes  Insight and Judgment: No  Insight and Judgment: Poor Judgment,Poor Insight  Present Suicidal Ideation: No  Present Homicidal Ideation: No    Pt admitted with followings belongings:  Dental Appliances: None  Vision - Corrective Lenses: None  Hearing Aid: None  Jewelry: Ring,Earrings  Body Piercings Removed: N/A  Clothing: Pants  Were All Patient Medications Collected?: Not Applicable  Other Valuables: None     Admission order obtained Yes  Belongings locked in patient belongings room. Patient's home medications were not brought in. Patient oriented to surroundings and program expectations and copy of patient rights given.  Received admission packet:  Yes  Consents reviewed, signed Yes. \"An Important Message from [employer] About Your Rights\" form reviewed, signed: N/A . Patient verbalize understanding: Yes. Patient education on precautions: YES/NO/NA: yes          Patient screened positive for suicide risk on CSSR-S (\"yes\" to question #4, 5, OR 6)  no. Physician notified of risk score. Orders received N/A .  2 person skin assessment completed upon admission refused 2 nurse skin assessment. States she just has the laceration on her left wrist that she done last night, denies any other open areas or rash. Explained patients right to have family, representative or physician notified of their admission. Patient has Declined for physician to be notified. Patient has Declined for family/representative to be notified. Provided pt with [employer] handout entitled \"Quitting Smoking. \"  Reviewed handout with pt addressing dangers of smoking, developing coping skills, and providing basic information about quitting. Pt response to counseling:  Not  Interested. Admission summary:   Patient has lost 3 family members recently. Had an argument with her  and daughter about her alcohol use. Denies ever having suicidal thoughts. Reports she  Was trying to cut her wrist tattoo off that is her father's name. States he was an alcoholic and now she is too.           Bard Lay RN

## 2022-04-14 NOTE — ED PROVIDER NOTES
[facility] Emergency Department    CHIEF COMPLAINT       Chief Complaint   Patient presents with    Suicidal       Nurses Notes reviewed and I agree except as noted in the HPI. HISTORY OF PRESENT ILLNESS    Abigail Hong rupinder 42 y.o. female who presents to the ED for evaluation of depression and suicidal ideation. Patient was upset tonight and was attempting to cut a tattoo off her left wrist.  She made multiple suicidal threats to her family member. HPI was provided by the patient    REVIEW OF SYSTEMS     Review of Systems   Constitutional: Negative for chills, fatigue and fever. HENT: Negative for congestion, ear discharge, ear pain, postnasal drip and rhinorrhea. Eyes: Negative for redness. Respiratory: Negative for cough and chest tightness. Cardiovascular: Negative for chest pain and leg swelling. Gastrointestinal: Negative for abdominal pain, nausea and vomiting. Genitourinary: Negative for difficulty urinating, dysuria, enuresis, flank pain and hematuria. Musculoskeletal: Negative for back pain and joint swelling. Skin: Positive for wound. Negative for rash. Neurological: Negative for dizziness, light-headedness, numbness and headaches. Psychiatric/Behavioral: Positive for behavioral problems, dysphoric mood and suicidal ideas. Negative for agitation and confusion. All other systems negative except as noted. PAST MEDICAL HISTORY     Past Medical History:   Diagnosis Date    Depression     Insomnia     Insomnia     Weight loss        SURGICALHISTORY      has a past surgical history that includes Tubal ligation; debbie and bso (cervix removed); other surgical history; and Dilation and curettage of uterus (4/29/16).     CURRENT MEDICATIONS       Discharge Medication List as of 4/18/2022  9:56 AM      CONTINUE these medications which have NOT CHANGED    Details   QUEtiapine (SEROQUEL) 50 MG tablet Take 50 mg by mouth nightlyHistorical Med      atomoxetine (STRATTERA) 25 MG capsule Take 25 mg by mouth dailyHistorical Med      oxybutynin (DITROPAN-XL) 10 MG extended release tablet Take 10 mg by mouth dailyHistorical Med      ROPINIRole HCl (REQUIP PO) Take by mouthHistorical Med      citalopram (CELEXA) 40 MG tablet Take 40 mg by mouth daily             ALLERGIES     has No Known Allergies. FAMILY HISTORY     She indicated that her mother is alive. She indicated that her father is alive. family history includes Diabetes in her mother. SOCIAL HISTORY       Social History     Socioeconomic History    Marital status:      Spouse name: Not on file    Number of children: 3    Years of education: 15    Highest education level: Not on file   Occupational History    Occupation:      Employer: [employer]   Tobacco Use    Smoking status: Current Every Day Smoker     Packs/day: 1.00     Years: 25.00     Pack years: 25.00     Types: Cigarettes    Smokeless tobacco: Never Used   Substance and Sexual Activity    Alcohol use: Yes     Alcohol/week: 14.0 standard drinks     Types: 14 Cans of beer per week    Drug use: Yes     Types: Marijuana Burgin Marck)    Sexual activity: Yes     Partners: Male   Other Topics Concern    Not on file   Social History Narrative    Not on file     Social Determinants of Health     Financial Resource Strain:     Difficulty of Paying Living Expenses: Not on file   Food Insecurity:     Worried About Running Out of Food in the Last Year: Not on file    David of Food in the Last Year: Not on file   Transportation Needs:     Lack of Transportation (Medical): Not on file    Lack of Transportation (Non-Medical):  Not on file   Physical Activity:     Days of Exercise per Week: Not on file    Minutes of Exercise per Session: Not on file   Stress:     Feeling of Stress : Not on file   Social Connections:     Frequency of Communication with Friends and Family: Not on file    Frequency of Social Gatherings with Friends and Family: Not on file    Attends Anglican Services: Not on file    Active Member of Clubs or Organizations: Not on file    Attends Club or Organization Meetings: Not on file    Marital Status: Not on file   Intimate Partner Violence:     Fear of Current or Ex-Partner: Not on file    Emotionally Abused: Not on file    Physically Abused: Not on file    Sexually Abused: Not on file   Housing Stability:     Unable to Pay for Housing in the Last Year: Not on file    Number of [place] in the Last Year: Not on file    Unstable Housing in the Last Year: Not on file       PHYSICAL EXAM     INITIAL VITALS:  height is 5' 5\" (1.651 m) and weight is 114 lb (51.7 kg). Her oral temperature is 98.1 °F (36.7 °C). Her blood pressure is 96/70 and her pulse is 84. Her respiration is 16 and oxygen saturation is 100%. Physical Exam  Constitutional:       General: She is not in acute distress. Appearance: She is well-developed. She is not diaphoretic. HENT:      Head: Normocephalic and atraumatic. Nose: Nose normal.      Mouth/Throat:      Mouth: Mucous membranes are moist.      Pharynx: Oropharynx is clear. Eyes:      Conjunctiva/sclera: Conjunctivae normal.   Cardiovascular:      Pulses: Normal pulses. Pulmonary:      Effort: Pulmonary effort is normal.   Musculoskeletal:         General: No deformity. Normal range of motion. Cervical back: Normal range of motion. Skin:     General: Skin is warm and dry. Capillary Refill: Capillary refill takes less than 2 seconds. Neurological:      General: No focal deficit present. Mental Status: She is alert and oriented to person, place, and time. Psychiatric:         Mood and Affect: Mood is depressed. Affect is flat and tearful. Behavior: Behavior is agitated. Thought Content: Thought content is not paranoid or delusional. Thought content includes suicidal ideation.  Thought content does not include homicidal ideation. Thought content includes suicidal plan. Thought content does not include homicidal plan. Judgment: Judgment is impulsive. Comments: Intoxicated           DIFFERENTIAL DIAGNOSIS:   Suicidal ideation, alcohol intoxication, self-inflicted laceration    DIAGNOSTIC RESULTS     EKG: All EKG's are interpreted by the Emergency Department Physician who eithersigns or Co-signs this chart in the absence of a cardiologist.        RADIOLOGY: non-plainfilm images(s) such as CT, Ultrasound and MRI are read by the radiologist.  Plain radiographic images are visualized and preliminarily interpreted by the emergency physician unless otherwise stated below. No orders to display         LABS:   Labs Reviewed   BASIC METABOLIC PANEL - Abnormal; Notable for the following components:       Result Value    Glucose 113 (*)     All other components within normal limits   CBC WITH AUTO DIFFERENTIAL - Abnormal; Notable for the following components:    MCHC 31.4 (*)     RDW-CV 16.8 (*)     RDW-SD 58.1 (*)     Platelets 893 (*)     Basophils Absolute 0.2 (*)     All other components within normal limits   HEPATIC FUNCTION PANEL - Abnormal; Notable for the following components:     Total Bilirubin 0.2 (*)     ALT 8 (*)     All other components within normal limits   SALICYLATE LEVEL - Abnormal; Notable for the following components:    Salicylate, Serum < 0.3 (*)     All other components within normal limits   URINE WITH REFLEXED MICRO - Abnormal; Notable for the following components:    Protein, UA 30 (*)     All other components within normal limits   COVID-19, RAPID   ACETAMINOPHEN LEVEL   ETHANOL   HCG, SERUM, QUALITATIVE   TSH   URINE DRUG SCREEN   ANION GAP   GLOMERULAR FILTRATION RATE, ESTIMATED   OSMOLALITY   ETHANOL       EMERGENCY DEPARTMENT COURSE:   Vitals:    Vitals:    04/16/22 1925 04/17/22 0800 04/17/22 1945 04/18/22 0747   BP: 110/63 129/70 (!) 109/45 96/70   Pulse: 76 111 79 84   Resp: 17 18 18 16 Temp: 97.8 °F (36.6 °C) 96.4 °F (35.8 °C) 96.8 °F (36 °C) 98.1 °F (36.7 °C)   TempSrc: Tympanic Tympanic Tympanic Oral   SpO2: 100% 99% 100% 100%   Weight:       Height:                                Internal Administration   First Dose      Second Dose           Last COVID Lab SARS-CoV-2, NAAT (no units)   Date Value   04/13/2022 NOT  DETECTED            MDM    Patient was seen evaluate in the emergency room for suicidal ideation and a self and collected laceration. Closed laceration per the procedure note. Care is transferred to [facility] pending sobriety. Medications   lidocaine 1 % injection (has no administration in time range)   chlordiazePOXIDE (LIBRIUM) capsule 5 mg (5 mg Oral Given 4/18/22 0827)       Please note that the patient was evaluated during a pandemic. All efforts were made for HIPPA compliance as well as provision of appropriate care. Patient was seen independently by myself. The patient's final impression and disposition and plan was determined by myself. Strict return precautions and follow up instructions were discussed with the patient prior to discharge, with which the patient agrees. Physical assessment findings, diagnostic testing(s) if applicable, and vital signs reviewed with patient/patient representative. Questions answered. Medications asdirected, including OTC medications for supportive care. Education provided on medications. Differential diagnosis(s) discussed with patient/patient representative. Home care/self care instructions reviewed withpatient/patient representative. Patient is to follow-up with family care provider in 2-3 days if no improvement. Patient is to go to the emergency department if symptoms worsen. Patient/patient representative isaware of care plan, questions answered, verbalizes understanding and is in agreement.      CRITICAL CARE:   None    CONSULTS:  RICKI    PROCEDURES:  Lac Repair    Date/Time: 4/14/2022 2:18 AM  Performed by: MEGA Bautista CNP  Authorized by: MEGA Bautista CNP     Consent:     Consent obtained:  Verbal    Consent given by:  Patient    Risks discussed:  Infection and pain  Anesthesia (see MAR for exact dosages): Anesthesia method:  Local infiltration    Local anesthetic:  Lidocaine 1% w/o epi  Laceration details:     Location:  Shoulder/arm    Shoulder/arm location:  L lower arm    Length (cm):  4    Depth (mm):  0.5  Repair type:     Repair type:  Simple  Pre-procedure details:     Preparation:  Patient was prepped and draped in usual sterile fashion  Exploration:     Hemostasis achieved with:  Direct pressure    Wound exploration: entire depth of wound probed and visualized      Wound extent: no foreign bodies/material noted, no muscle damage noted and no vascular damage noted      Contaminated: no    Treatment:     Area cleansed with:  Torres    Amount of cleaning:  Standard    Visualized foreign bodies/material removed: no    Skin repair:     Repair method:  Sutures    Suture size:  4-0    Wound skin closure material used: ethilon. Suture technique:  Simple interrupted  Approximation:     Approximation:  Close  Post-procedure details:     Dressing:  Non-adherent dressing    Patient tolerance of procedure: Tolerated well, no immediate complications        FINAL IMPRESSION     1. Depression with suicidal ideation    2. Acute alcoholic intoxication without complication ([facility] [address]    3. Self-inflicted laceration of wrist, initial encounter [facility])          DISPOSITION/PLAN   DISPOSITION Admitted 04/14/2022 06:55:02 AM      PATIENT REFERREDTO:  [address] [place] [address],[address] [place] [address]  In 1 day  schedule an appointment for 4/22/22 for suture removal    [place] [address] [address] [facility] [address]  [phone]    Go to  Patient is to go to [facility] for walk in assessment services.  Monday - Friday 8-3       DISCHARGE MEDICATIONS:  Discharge Medication List as of 4/18/2022  9:56 AM      START taking these medications    Details   traZODone (DESYREL) 50 MG tablet Take 1 tablet by mouth nightly as needed for Sleep, Disp-30 tablet, R-0Normal             (Please note that portions of this note were completed with a voice recognition program.  Efforts were made to edit the dictations but occasionally words are mis-transcribed.)         MEGA Cabezas CNP, APRN - CNP  04/30/22 2050

## 2022-04-14 NOTE — ED NOTES
Pt in bed sleeping at this time with no s/s of distress noted. Security continues to monitor.      Ban Saldana RN  04/13/22 7451

## 2022-04-15 PROCEDURE — APPSS30 APP SPLIT SHARED TIME 16-30 MINUTES: Performed by: PHYSICIAN ASSISTANT

## 2022-04-15 PROCEDURE — 6370000000 HC RX 637 (ALT 250 FOR IP): Performed by: PHYSICIAN ASSISTANT

## 2022-04-15 PROCEDURE — 1240000000 HC EMOTIONAL WELLNESS R&B

## 2022-04-15 PROCEDURE — 90833 PSYTX W PT W E/M 30 MIN: CPT | Performed by: PSYCHIATRY & NEUROLOGY

## 2022-04-15 PROCEDURE — 6370000000 HC RX 637 (ALT 250 FOR IP): Performed by: PSYCHIATRY & NEUROLOGY

## 2022-04-15 PROCEDURE — 99232 SBSQ HOSP IP/OBS MODERATE 35: CPT | Performed by: PSYCHIATRY & NEUROLOGY

## 2022-04-15 RX ADMIN — ATOMOXETINE 25 MG: 25 CAPSULE ORAL at 10:12

## 2022-04-15 RX ADMIN — CHLORDIAZEPOXIDE HYDROCHLORIDE 10 MG: 10 CAPSULE ORAL at 21:36

## 2022-04-15 RX ADMIN — CHLORDIAZEPOXIDE HYDROCHLORIDE 10 MG: 10 CAPSULE ORAL at 10:12

## 2022-04-15 RX ADMIN — CITALOPRAM 40 MG: 40 TABLET, FILM COATED ORAL at 10:12

## 2022-04-15 RX ADMIN — ROPINIROLE HYDROCHLORIDE 2 MG: 1 TABLET, FILM COATED ORAL at 21:36

## 2022-04-15 RX ADMIN — CHLORDIAZEPOXIDE HYDROCHLORIDE 10 MG: 10 CAPSULE ORAL at 15:31

## 2022-04-15 RX ADMIN — TRAZODONE HYDROCHLORIDE 50 MG: 50 TABLET ORAL at 21:37

## 2022-04-15 RX ADMIN — OXYBUTYNIN CHLORIDE 10 MG: 10 TABLET, EXTENDED RELEASE ORAL at 10:12

## 2022-04-15 RX ADMIN — QUETIAPINE FUMARATE 50 MG: 25 TABLET ORAL at 21:36

## 2022-04-15 ASSESSMENT — PAIN SCALES - GENERAL
PAINLEVEL_OUTOF10: 0
PAINLEVEL_OUTOF10: 4

## 2022-04-15 ASSESSMENT — PAIN - FUNCTIONAL ASSESSMENT
PAIN_FUNCTIONAL_ASSESSMENT: ACTIVITIES ARE NOT PREVENTED
PAIN_FUNCTIONAL_ASSESSMENT: ACTIVITIES ARE NOT PREVENTED

## 2022-04-15 ASSESSMENT — PAIN DESCRIPTION - ORIENTATION: ORIENTATION: LEFT

## 2022-04-15 ASSESSMENT — PAIN DESCRIPTION - FREQUENCY
FREQUENCY: INTERMITTENT
FREQUENCY: INTERMITTENT

## 2022-04-15 ASSESSMENT — PAIN DESCRIPTION - DESCRIPTORS: DESCRIPTORS: ACHING

## 2022-04-15 ASSESSMENT — PAIN DESCRIPTION - PROGRESSION
CLINICAL_PROGRESSION: NOT CHANGED
CLINICAL_PROGRESSION: NOT CHANGED

## 2022-04-15 ASSESSMENT — PAIN DESCRIPTION - ONSET
ONSET: ON-GOING
ONSET: ON-GOING

## 2022-04-15 ASSESSMENT — PAIN DESCRIPTION - LOCATION: LOCATION: WRIST

## 2022-04-15 ASSESSMENT — PAIN DESCRIPTION - PAIN TYPE: TYPE: ACUTE PAIN

## 2022-04-15 NOTE — PROGRESS NOTES
Department of Psychiatry  Progress Note     Chief Complaint:  Suicide attempt    PROGRESS:  Aurelio Yates is seen laying in bed. She is minimally engaged with the interview and provides short, one-word answers often fine to the assessment questions. She appears to be very irritable. She has limited insight into the severity of the events that led to her admission. Aurelio Yates reports she is \"fine\" today. She says her mood is \"fine. \"  Depression is \"fine. \"  She says she is depressed because she is admitted to our unit. She says \"would you be if you were in here? \"  She denies any active suicidal ideation and rolls her eyes when asked. She is able to contract for safety in the unit. She endorses feeling hopeless and helpless about her situation again because she is admitted. She says she slept fine last night. Staff reported she slept 8.5 hours continuous. Her appetite has been fine. She has been compliant with her medications and denies any side effects. She has been isolative to her room and has not been attending groups. Staff reported she has been sweating and irritable. When asked if she is experiencing any alcohol withdrawal symptoms, she  says \"I do not go through withdrawals. I told you that\" with an irritable tone. Her blood pressure has been stable. The interview was then ended at that time as patient continued to be dismissive and irritable with this writer.     Suicidal ideations: denies active suicidal ideation  Compliance with medications: good   Medication side effects: absent  ROS: Patient has new complaints:  no  Sleep quality: 8.5 hours continuous last night per staff  Attending groups:no      OBJECTIVE      Medications  Current Facility-Administered Medications: ibuprofen (ADVIL;MOTRIN) tablet 400 mg, 400 mg, Oral, Q6H PRN  magnesium hydroxide (MILK OF MAGNESIA) 400 MG/5ML suspension 30 mL, 30 mL, Oral, Daily PRN  aluminum & magnesium hydroxide-simethicone (MAALOX) 121-424-11 MG/5ML suspension 30 mL, 30 mL, Oral, Q6H PRN  hydrOXYzine (ATARAX) tablet 50 mg, 50 mg, Oral, TID PRN  acetaminophen (TYLENOL) tablet 650 mg, 650 mg, Oral, Q4H PRN  traZODone (DESYREL) tablet 50 mg, 50 mg, Oral, Nightly PRN  chlordiazePOXIDE (LIBRIUM) capsule 10 mg, 10 mg, Oral, TID  citalopram (CELEXA) tablet 40 mg, 40 mg, Oral, Daily  QUEtiapine (SEROQUEL) tablet 50 mg, 50 mg, Oral, Nightly  rOPINIRole (REQUIP) tablet 2 mg, 2 mg, Oral, Nightly  oxybutynin (DITROPAN-XL) extended release tablet 10 mg, 10 mg, Oral, Daily  atomoxetine (STRATTERA) capsule 25 mg, 25 mg, Oral, Daily     Physical     height is 5' 5\" (1.651 m) and weight is 114 lb (51.7 kg). Her tympanic temperature is 97.2 °F (36.2 °C). Her blood pressure is 125/66 and her pulse is 83. Her respiration is 18 and oxygen saturation is 98%. Lab Results   Component Value Date    WBC 7.5 04/13/2022    HGB 14.3 04/13/2022    HCT 45.6 04/13/2022     (H) 04/13/2022    CHOL 177 07/09/2021    TRIG 72 07/09/2021    HDL 75 07/09/2021    ALT 8 (L) 04/13/2022    AST 16 04/13/2022     04/13/2022    K 3.6 04/13/2022     04/13/2022    CREATININE 0.7 04/13/2022    BUN 7 04/13/2022    CO2 24 04/13/2022    TSH 2.820 04/13/2022    INR 0.91 04/20/2016          Mental Status Exam:   Level of consciousness:  Awake   Appearance:  well-appearing, hospital attire, lying in bed, fair grooming and fair hygiene  Behavior/Motor:  agitated  attitude toward examiner: minimally engaged, dismissive  Speech:  spontaneous, normal rate and normal volume.  Irritable tone   Mood:  irritable   Affect: mood congruent   Thought processes:  linear, goal directed and coherent  Thought content:  Denies homicidal ideation  Suicidal Ideation:  Denies active suicidal ideation  Delusions:  no evidence of delusions  Perceptual Disturbance:  denies any perceptual disturbance  Cognition: Patient is oriented to person, place, time and situation  Concentration: clinically adequate  Memory: intact  Insight & Judgement: poor      ASSESSMENT     Depression with suicidal ideation   Alcohol use disorder, severe, dependence  Cannabis use disorder, severe, dependence  Rule out substance induced mood disorder    PLAN    Patient's symptoms show no change today   Medication adjustments as discussed with the attending physician: Continue current medication regimen and observe today   Attempt to develop insight, psycho-education and supportive therapy conducted. Probable discharge: to be determined  Follow-up: [facility] outpatient, daily while on inpatient unit     Electronically signed by Kennedi Ang PA-C on 4/15/2022 at 12:46 PM Reviewed patient's current plan of care and vital signs with nursing staff. **This report has been created using voice recognition software. It may contain minor errors which are inherent in voice recognition technology. **                                      Psychiatry Attending Attestation     I assessed this patient and reviewed the case and plan of care with Kennedi Ang PA-C. I have reviewed the above documentation and I agree with the findings and treatment plan with the following updates:  Patient was very dismissive during the interview. Discussed with her at length about the substance use issues that she has been having and concerns that her  mentioned about her driving while intoxicated with her grandchildren present in the car. She was little insight into the severity of her behaviors. Continues to be very impulsive and at times yelling during the conversation. Very discharge focus however unable to give a good safety plan. Has been isolated to her room and has not been attending any groups. Encourage good compliance today. PLAN  Patient s symptoms   show no change  Encourage group compliance, continue same medication today  Attempt to develop insight  Psycho-education conducted. Supportive Therapy conducted.   Probable discharge is TBD  Follow-up TBD    More than 16 mins of the session was spent doing Supportive psychotherapy and coordinating patient's care. Session lasted over 30 minutes today. Electronically signed by Sheeba Yeager MD on 4/15/22 at 4:23 PM EDT    **This report has been created using voice recognition software. It may contain minor errors which are inherent in voice recognition technology. **

## 2022-04-15 NOTE — PLAN OF CARE
Problem: Altered Mood, Depressive Behavior:  Goal: Ability to disclose and discuss suicidal ideas will improve  Description: Ability to disclose and discuss suicidal ideas will improve  Outcome: Met This Shift  Note: Denies suicidal thoughts or plans   Goal: Able to verbalize support systems  Description: Able to verbalize support systems  Outcome: Met This Shift  Note: States that her  is supportive   Goal: Absence of self-harm  Description: Absence of self-harm  Outcome: Met This Shift  Note: No self harm thoughts, plans, or behaviors this shift   Goal: Participates in care planning  Description: Participates in care planning  Outcome: Met This Shift  Note: Care plan reviewed with patient. Patient verbalizes understanding of the plan of care and contribute to goal setting. Problem: Discharge Planning:  Goal: Discharged to appropriate level of care  Description: Discharged to appropriate level of care  Outcome: Met This Shift  Note: Works with an interdisciplinary team towards meeting discharge needs      Problem: Anxiety:  Goal: Level of anxiety will decrease  Description: Level of anxiety will decrease  Outcome: Met This Shift  Note: Denies feeling anxious this shift      Problem:  Activity:  Goal: Sleeping patterns will improve  Description: Sleeping patterns will improve  Outcome: Met This Shift  Note: Took scheduled seroquel for sleep      Problem: Altered Mood, Depressive Behavior:  Goal: Able to verbalize and/or display a decrease in depressive symptoms  Description: Able to verbalize and/or display a decrease in depressive symptoms  Outcome: Ongoing  Note: Incongruently rates mood a 8/10 this shift   Goal: Patient specific goal  Description: Patient specific goal  Outcome: Ongoing  Note: Encouraged to set a daily goal.      Problem: Substance Abuse:  Goal: Absence of drug withdrawal signs and symptoms  Description: Absence of drug withdrawal signs and symptoms  Outcome: Ongoing  Note: Diaphoretic this shift      Problem: Coping:  Goal: Ability to identify problematic behaviors that deter socialization will improve  Description: Ability to identify problematic behaviors that deter socialization will improve  4/14/2022 2153 by Bao Prado RN  Outcome: Ongoing  4/14/2022 1353 by Dante García  Outcome: Not Met This Shift

## 2022-04-15 NOTE — PROGRESS NOTES
Writer examines patient's sutures . Clean, dry and in tact. No redness or swelling noted. Patient refuses dressing change. States \"there's no need. This is fine. \" No drainage noted on bandage. Will continue to monitor.

## 2022-04-15 NOTE — PROGRESS NOTES
Discharge planning- Patient is to go to [facility] for walk in assessment services. Monday - Friday starting at 8am is first come first serve.

## 2022-04-15 NOTE — PLAN OF CARE
Problem: Altered Mood, Depressive Behavior:  Goal: Able to verbalize and/or display a decrease in depressive symptoms  Description: Able to verbalize and/or display a decrease in depressive symptoms  Outcome: Ongoing  Note: Patient denies having depression; but, exhibits symptoms. See flow sheet. Goal: Ability to disclose and discuss suicidal ideas will improve  Description: Ability to disclose and discuss suicidal ideas will improve  Outcome: Met This Shift  Note: Patient denies having suicidal ideation this shift. Goal: Able to verbalize support systems  Description: Able to verbalize support systems  Outcome: Met This Shift  Note: Patient verbalizes her  and kids are her support this shift. Goal: Absence of self-harm  Description: Absence of self-harm  Outcome: Met This Shift  Note: Patient makes no attempt to harm self to this point in the shift. Goal: Patient specific goal  Description: Patient specific goal  Outcome: Met This Shift  Note: Patient sets goal \"to go home\" with writer this shift. Goal: Participates in care planning  Description: Participates in care planning  Outcome: Met This Shift  Note: Patient actively participates in care planning with care team this shift. Problem: Substance Abuse:  Goal: Absence of drug withdrawal signs and symptoms  Description: Absence of drug withdrawal signs and symptoms  Outcome: Ongoing  Note: Patient denies symptoms however is taking librium TID. Problem: Discharge Planning:  Goal: Discharged to appropriate level of care  Description: Discharged to appropriate level of care  Outcome: Not Met This Shift  Note: Patient not discharged this shift. Patient continues to work with care team toward discharge goal.      Problem: Anxiety:  Goal: Level of anxiety will decrease  Description: Level of anxiety will decrease  Outcome: Met This Shift  Note: Patient denies having anxiety this shift. Problem:  Activity:  Goal: Sleeping patterns will improve  Description: Sleeping patterns will improve  Outcome: Met This Shift  Note: Patient's reported sleep is 8.5 hours continuous. Problem: Coping:  Goal: Ability to identify problematic behaviors that deter socialization will improve  Description: Ability to identify problematic behaviors that deter socialization will improve  4/15/2022 1643 by Mat Yarbrough RN  Outcome: Not Met This Shift  Note: Patient isolates to bed to this point in the shift.    4/15/2022 1144 by Rocco Martin  Outcome: Not Met This Shift

## 2022-04-16 PROCEDURE — 6370000000 HC RX 637 (ALT 250 FOR IP): Performed by: PSYCHIATRY & NEUROLOGY

## 2022-04-16 PROCEDURE — 6370000000 HC RX 637 (ALT 250 FOR IP): Performed by: PHYSICIAN ASSISTANT

## 2022-04-16 PROCEDURE — 99231 SBSQ HOSP IP/OBS SF/LOW 25: CPT | Performed by: NURSE PRACTITIONER

## 2022-04-16 PROCEDURE — 1240000000 HC EMOTIONAL WELLNESS R&B

## 2022-04-16 RX ADMIN — ACETAMINOPHEN 650 MG: 325 TABLET ORAL at 14:37

## 2022-04-16 RX ADMIN — ATOMOXETINE 25 MG: 25 CAPSULE ORAL at 08:03

## 2022-04-16 RX ADMIN — ROPINIROLE HYDROCHLORIDE 2 MG: 1 TABLET, FILM COATED ORAL at 22:01

## 2022-04-16 RX ADMIN — TRAZODONE HYDROCHLORIDE 50 MG: 50 TABLET ORAL at 22:03

## 2022-04-16 RX ADMIN — OXYBUTYNIN CHLORIDE 10 MG: 10 TABLET, EXTENDED RELEASE ORAL at 08:03

## 2022-04-16 RX ADMIN — CITALOPRAM 40 MG: 40 TABLET, FILM COATED ORAL at 08:03

## 2022-04-16 RX ADMIN — QUETIAPINE FUMARATE 50 MG: 25 TABLET ORAL at 22:01

## 2022-04-16 RX ADMIN — CHLORDIAZEPOXIDE HYDROCHLORIDE 10 MG: 10 CAPSULE ORAL at 08:03

## 2022-04-16 ASSESSMENT — PAIN SCALES - GENERAL
PAINLEVEL_OUTOF10: 0
PAINLEVEL_OUTOF10: 0
PAINLEVEL_OUTOF10: 7

## 2022-04-16 ASSESSMENT — PAIN - FUNCTIONAL ASSESSMENT: PAIN_FUNCTIONAL_ASSESSMENT: ACTIVITIES ARE NOT PREVENTED

## 2022-04-16 ASSESSMENT — PAIN DESCRIPTION - PROGRESSION: CLINICAL_PROGRESSION: NOT CHANGED

## 2022-04-16 NOTE — BH NOTE
Group Therapy Note    Date: 4/15/2022  Start Time: 2000  End Time:  2020  Number of Participants: 1    Type of Group: Wrap-Up    Wellness Binder Information  Module Name:    Session Number:      Patient's Goal:  To be discharged    Notes: Working on goal    Status After Intervention:  Improved    Participation Level: Active Listener    Participation Quality: Appropriate      Speech:  normal      Thought Process/Content: Logical      Affective Functioning: Congruent      Mood: anxious      Level of consciousness:  Alert      Response to Learning: Able to verbalize current knowledge/experience      Endings: None Reported    Modes of Intervention: Education      Discipline Responsible: Registered Nurse      Signature:   Sean Hercules RN

## 2022-04-16 NOTE — PROGRESS NOTES
Psychiatry Progress Note      4-    CC: Suicidal gesture by cutting                    Subjective    Progress:  [place] reports her mood is improving and depression is less. Denies feelings of harm towards self or others. States she is gald she came to [facility] as helped her realize she needs to get control of her drinking problem. States she is going to [facility] when released for [facility] counseling. Reports meds are working \"ok\" Denies having side effects. Good med compliance is verified. Reports appetite and sleep are improving. Verified slept 8.5 hours continuous. Denies going to groups yet. But states she will attend groups today. States her  and sister have been visiting. Objective  /79   Pulse 86   Temp 98.6 °F (37 °C) (Tympanic)   Resp 17   Ht 5' 5\" (1.651 m)   Wt 114 lb (51.7 kg)   LMP 04/06/2022   SpO2 98%   BMI 18.97 kg/m²      MSE:  Level of consciousness: Alert  Appearance: hospital attire, in chair and fair grooming   Behavior/Motor: no abnormalities noted   Attitude toward examiner: cooperative   Speech: Normal volume, circumstantial   Mood: Dysthymic   Affect: Reactive  Thought processes: Linear and goal directed   Suicidal Ideation: Denies suicidal ideations  Homicidal ideation: Denies homicidal ideations  Delusions: No evidence of delusions is observed  Perceptual Disturbance: Denies AH/VH;  No evidence of psychosis is observed. Cognition: Oriented to person, place, time and situation   Concentration fair   Memory intact   Insight: Limited  Judgment: Limited    Assessment:  Depression with suicidal ideation   Alcohol use disorder, severe, dependence  Cannabis use disorder, severe, dependence  Rule out substance induced mood disorder    Plan:  Continue current meds as ordered  Continue to encourage group attendance.       Khai Hutchins, MIGUEL ANGEL  4-

## 2022-04-16 NOTE — PLAN OF CARE
Problem: Altered Mood, Depressive Behavior:  Goal: Able to verbalize and/or display a decrease in depressive symptoms  Description: Able to verbalize and/or display a decrease in depressive symptoms  4/16/2022 1146 by Marci Peñaloza RN  Outcome: Met This Shift  Note: Patient denies having depression this shift. 4/16/2022 0032 by Dana Capellan RN  Outcome: Ongoing  Note: Patient denies depression but is isolative to room accept for needs and shower. Patient states mood is 7/10  Goal: Ability to disclose and discuss suicidal ideas will improve  Description: Ability to disclose and discuss suicidal ideas will improve  4/16/2022 1146 by Marci Peñaloza RN  Outcome: Met This Shift  Note: Patient denies having suicidal ideation this shift. 4/16/2022 0032 by Dana Capellan RN  Outcome: Ongoing  Note: Patient denies self harm or suicidal thoughts and remained safe and free of harm  Goal: Able to verbalize support systems  Description: Able to verbalize support systems  4/16/2022 1146 by Marci Peñaloza RN  Outcome: Met This Shift  Note: Patient verbalizes her  and kids are her support this shift. 4/16/2022 0032 by Dana Capellan RN  Outcome: Ongoing  Note: Patient reports a positive support system  Goal: Absence of self-harm  Description: Absence of self-harm  4/16/2022 1146 by Marci Peñaloza RN  Outcome: Met This Shift  Note: Patient makes no attempt to harm self to this point in the shift. 4/16/2022 0032 by Dana Capellan RN  Outcome: Ongoing  Note: Patient denies self harm or suicidal thoughts and remained safe and free of harm  Goal: Patient specific goal  Description: Patient specific goal  4/16/2022 1146 by Marci Peñaloza RN  Outcome: Met This Shift  Note: Patient sets goal \"to go home\" with writer this shift.    4/16/2022 0032 by Dana Capellan RN  Outcome: Ongoing  Note: Patient working on goal of being discharged  Goal: Participates in care planning  Description: Participates in care planning  4/16/2022 1146 by Keely Andujar RN  Outcome: Met This Shift  Note: Patient actively participates in care planning with care team this shift. 4/16/2022 0032 by Natalie Stevens RN  Outcome: Ongoing  Note: Pt is taking medications, attending and participating in groups and care planning. Pt has good interaction with staff and peers      Problem: Substance Abuse:  Goal: Absence of drug withdrawal signs and symptoms  Description: Absence of drug withdrawal signs and symptoms  4/16/2022 1146 by Keely Andujar RN  Outcome: Ongoing  Note: Patient denies having symptoms of alcohol withdrawal this shift. Patient continues on librium for withdrawals. 4/16/2022 0032 by Natalie Stevens RN  Outcome: Ongoing  Note: Patient denies any withdrawal signs or symptoms     Problem: Discharge Planning:  Goal: Discharged to appropriate level of care  Description: Discharged to appropriate level of care  4/16/2022 1146 by Keely Andujar RN  Outcome: Not Met This Shift  Note: Patient not discharged this shift. Patient continues to work with care team toward discharge goal.   4/16/2022 0032 by Natalie Stevens RN  Outcome: Ongoing  Note: Patient to be discharged home and follow with PCP     Problem: Anxiety:  Goal: Level of anxiety will decrease  Description: Level of anxiety will decrease  4/16/2022 1146 by Keely Andujar RN  Outcome: Met This Shift  Note: Patient denies having anxiety this shift. 4/16/2022 0032 by Natalie Stevens RN  Outcome: Ongoing  Note: Patient denies depression but is isolative to room accept for needs and shower. Patient states mood is 7/10     Problem: Activity:  Goal: Sleeping patterns will improve  Description: Sleeping patterns will improve  4/16/2022 1146 by Keely Andujar RN  Outcome: Met This Shift  Note: Patient's reported sleep is 8.5 hours continuous.    4/16/2022 0032 by Natalie Stevens RN  Outcome: Ongoing  Note: Patient resting quietly with no distress noted     Problem: Coping:  Goal: Ability

## 2022-04-16 NOTE — GROUP NOTE
Group Therapy Note    Date: 4/16/2022    Group Start Time: 1330  Group End Time: 1400  Group Topic: Healthy Living/Wellness    [facility] Adult Psych 4E    Michael Dey LPN        Group Therapy Note    Attendees: 6           Notes:  attended    Status After Intervention:  Improved    Participation Level:  Active Listener and Interactive    Participation Quality: Appropriate, Attentive and Sharing      Speech:  normal      Thought Process/Content: Logical      Affective Functioning: Flat        Level of consciousness:  Alert, Oriented x4 and Attentive      Response to Learning: Able to verbalize current knowledge/experience, Able to verbalize/acknowledge new learning and Able to retain information      Endings: None Reported    Modes of Intervention: Education, Support and Socialization      Discipline Responsible: Licensed Practical Nurse      Signature:  Michael Dey LPN

## 2022-04-16 NOTE — PLAN OF CARE
Problem: Altered Mood, Depressive Behavior:  Goal: Able to verbalize and/or display a decrease in depressive symptoms  Description: Able to verbalize and/or display a decrease in depressive symptoms  4/16/2022 0032 by Ferny Wheeler RN  Outcome: Ongoing  Note: Patient denies depression but is isolative to room accept for needs and shower. Patient states mood is 7/10  4/15/2022 1643 by Keshav Allen RN  Outcome: Ongoing  Note: Patient denies having depression; but, exhibits symptoms. See flow sheet. Goal: Ability to disclose and discuss suicidal ideas will improve  Description: Ability to disclose and discuss suicidal ideas will improve  4/16/2022 0032 by Ferny Wheeler RN  Outcome: Ongoing  Note: Patient denies self harm or suicidal thoughts and remained safe and free of harm  4/15/2022 1643 by Keshav Allen RN  Outcome: Met This Shift  Note: Patient denies having suicidal ideation this shift. Goal: Able to verbalize support systems  Description: Able to verbalize support systems  4/16/2022 0032 by Ferny Wheeler RN  Outcome: Ongoing  Note: Patient reports a positive support system  4/15/2022 1643 by Keshav Allen RN  Outcome: Met This Shift  Note: Patient verbalizes her  and kids are her support this shift. Goal: Absence of self-harm  Description: Absence of self-harm  4/16/2022 0032 by Ferny Wheeler RN  Outcome: Ongoing  Note: Patient denies self harm or suicidal thoughts and remained safe and free of harm  4/15/2022 1643 by Keshav Allen RN  Outcome: Met This Shift  Note: Patient makes no attempt to harm self to this point in the shift. Goal: Patient specific goal  Description: Patient specific goal  4/16/2022 0032 by Ferny Wheeler RN  Outcome: Ongoing  Note: Patient working on goal of being discharged  4/15/2022 1643 by Keshav Allen RN  Outcome: Met This Shift  Note: Patient sets goal \"to go home\" with writer this shift.    Goal: Participates in care planning  Description: Participates in care planning  4/16/2022 0032 by Bernarda Fonseca RN  Outcome: Ongoing  Note: Pt is taking medications, attending and participating in groups and care planning. Pt has good interaction with staff and peers   4/15/2022 1643 by Brisa Ohara RN  Outcome: Met This Shift  Note: Patient actively participates in care planning with care team this shift. Problem: Substance Abuse:  Goal: Absence of drug withdrawal signs and symptoms  Description: Absence of drug withdrawal signs and symptoms  4/16/2022 0032 by Bernarda Fonseca RN  Outcome: Ongoing  Note: Patient denies any withdrawal signs or symptoms  4/15/2022 1643 by Brisa Ohara RN  Outcome: Ongoing  Note: Patient denies symptoms however is taking librium TID. Problem: Discharge Planning:  Goal: Discharged to appropriate level of care  Description: Discharged to appropriate level of care  4/16/2022 0032 by Bernarda Fonseca RN  Outcome: Ongoing  Note: Patient to be discharged home and follow with PCP  4/15/2022 1643 by Brisa Ohara RN  Outcome: Not Met This Shift  Note: Patient not discharged this shift. Patient continues to work with care team toward discharge goal.      Problem: Anxiety:  Goal: Level of anxiety will decrease  Description: Level of anxiety will decrease  4/16/2022 0032 by Bernarda Fonseca RN  Outcome: Ongoing  Note: Patient denies depression but is isolative to room accept for needs and shower. Patient states mood is 7/10  4/15/2022 1643 by Brisa Ohara RN  Outcome: Met This Shift  Note: Patient denies having anxiety this shift. Problem: Activity:  Goal: Sleeping patterns will improve  Description: Sleeping patterns will improve  4/16/2022 0032 by Bernarda Fonseca RN  Outcome: Ongoing  Note: Patient resting quietly with no distress noted  4/15/2022 1643 by Brisa Ohara RN  Outcome: Met This Shift  Note: Patient's reported sleep is 8.5 hours continuous.       Problem: Pain:  Goal: Pain level will decrease  Description: Pain level will decrease  Outcome: Ongoing  Note: Patient denies pain or discomfort  Goal: Control of acute pain  Description: Control of acute pain  Outcome: Ongoing  Note: Patient denies pain or discomfort  Goal: Control of chronic pain  Description: Control of chronic pain  Outcome: Ongoing  Note: Patient denies pain or discomfort     Problem: Coping:  Goal: Ability to identify problematic behaviors that deter socialization will improve  Description: Ability to identify problematic behaviors that deter socialization will improve  4/16/2022 0032 by Salima Singleton RN  Outcome: Not Met This Shift  Note: Patient interacting well with room mate but isolative to room, no interaction with other peers  4/15/2022 1643 by Vonn Phoenix, RN  Outcome: Not Met This Shift  Note: Patient isolates to bed to this point in the shift. 4/15/2022 1144 by Alan Looney  Outcome: Not Met This Shift   Care plan reviewed with patient and verbalize understanding of the plan of care and contribute to goal setting.

## 2022-04-16 NOTE — GROUP NOTE
Group Therapy Note    Date: 2022    Group Start Time: 1000  Group End Time: 1030  Group Topic: [address] Adult Psych 4E    Sanjuanita Wayne, NAOMI        Group Therapy Note    Attendees: 6         Patient's Goal:  ***    Notes:  ***    Status After Intervention:  {Status After Intervention:716177747}    Participation Level: {Participation Level:646154341}    Participation Quality: {[facility] PARTICIPATION QUALITY:145394924}      Speech:  {[facility] CD_SPEECH:75151}      Thought Process/Content: {Thought Process/Content:993662268}      Affective Functioning: {Affective Functionin}      Mood: {Mood:405906058}      Level of consciousness:  {Level of consciousness:812928506}      Response to Learnin [facility] Responses to Learnin}      Endings: {[facility] Endings:32005}    Modes of Intervention: {[facility] Modes of Intervention:632765346}      Discipline Responsible: {[facility] Multidisciplinary:244204674}      Signature:  ruben Araiza

## 2022-04-16 NOTE — GROUP NOTE
Group Therapy Note    Date: 4/16/2022    Group Start Time: 1000  Group End Time: 1030  Group Topic: [address] Adult Psych 4E    Pelon Little, DEES        Group Therapy Note    Attendees: 6         Patient's Goal:  To finish my book today. Notes:  Pt does require increased prompts in order to offer an obtainable goal. Pt is pleasant and cooperative with peers and staff and engages in group therapy conversation appropriately. Status After Intervention:  Improved    Participation Level:  Active Listener and Interactive    Participation Quality: Appropriate, Attentive and Sharing      Speech:  normal      Thought Process/Content: Logical      Affective Functioning: Congruent      Mood: euthymic      Level of consciousness:  Alert, Oriented x4 and Attentive      Response to Learning: Able to verbalize current knowledge/experience, Able to verbalize/acknowledge new learning, Able to retain information and Progressing to goal      Endings: None Reported    Modes of Intervention: Education, Support, Socialization, Exploration, Clarifying, Activity, Limit-setting and Reality-testing      Discipline Responsible: Psychoeducational Specialist      Signature:  Evelin Mckeon

## 2022-04-16 NOTE — GROUP NOTE
Group Therapy Note    Date: 4/16/2022    Group Start Time: 1030  Group End Time: 1 [address]  Group Topic: Recreational    [facility] Adult Psych 4E    Mililani Green, CTRS        Group Therapy Note    Attendees: 6         Patient's Goal:  Pt to identify at least x2 positive coping skills as well as increase socialization and improve mood through participation in [employer] activity with peers. Notes:  Pt is engaging in group therapy conversation and verbalized improvement in mood through activity with peers. Pt is noted to identify positive coping skills and offers appropriate support and conversation to peers. Status After Intervention:  Improved    Participation Level:  Active Listener and Interactive    Participation Quality: Appropriate, Attentive and Sharing      Speech:  normal      Thought Process/Content: Logical      Affective Functioning: Congruent      Mood: euthymic      Level of consciousness:  Alert, Oriented x4 and Attentive      Response to Learning: Able to verbalize current knowledge/experience, Able to verbalize/acknowledge new learning, Able to retain information and Progressing to goal      Endings: None Reported    Modes of Intervention: Education, Support, Socialization, Exploration, Clarifying, Activity, Limit-setting and Reality-testing      Discipline Responsible: Psychoeducational Specialist      Signature:  Evelin Ruby

## 2022-04-17 PROCEDURE — 6370000000 HC RX 637 (ALT 250 FOR IP): Performed by: PSYCHIATRY & NEUROLOGY

## 2022-04-17 PROCEDURE — 1240000000 HC EMOTIONAL WELLNESS R&B

## 2022-04-17 PROCEDURE — 6370000000 HC RX 637 (ALT 250 FOR IP): Performed by: PHYSICIAN ASSISTANT

## 2022-04-17 PROCEDURE — 99231 SBSQ HOSP IP/OBS SF/LOW 25: CPT | Performed by: NURSE PRACTITIONER

## 2022-04-17 RX ADMIN — CITALOPRAM 40 MG: 40 TABLET, FILM COATED ORAL at 08:16

## 2022-04-17 RX ADMIN — QUETIAPINE FUMARATE 50 MG: 25 TABLET ORAL at 21:40

## 2022-04-17 RX ADMIN — TRAZODONE HYDROCHLORIDE 50 MG: 50 TABLET ORAL at 21:40

## 2022-04-17 RX ADMIN — CHLORDIAZEPOXIDE HYDROCHLORIDE 10 MG: 10 CAPSULE ORAL at 21:40

## 2022-04-17 RX ADMIN — ACETAMINOPHEN 650 MG: 325 TABLET ORAL at 16:12

## 2022-04-17 RX ADMIN — ATOMOXETINE 25 MG: 25 CAPSULE ORAL at 08:16

## 2022-04-17 RX ADMIN — OXYBUTYNIN CHLORIDE 10 MG: 10 TABLET, EXTENDED RELEASE ORAL at 08:16

## 2022-04-17 RX ADMIN — ROPINIROLE HYDROCHLORIDE 2 MG: 1 TABLET, FILM COATED ORAL at 21:40

## 2022-04-17 ASSESSMENT — PAIN SCALES - GENERAL
PAINLEVEL_OUTOF10: 0
PAINLEVEL_OUTOF10: 7
PAINLEVEL_OUTOF10: 0

## 2022-04-17 ASSESSMENT — PAIN DESCRIPTION - FREQUENCY: FREQUENCY: INTERMITTENT

## 2022-04-17 ASSESSMENT — PAIN DESCRIPTION - PROGRESSION: CLINICAL_PROGRESSION: NOT CHANGED

## 2022-04-17 ASSESSMENT — PAIN - FUNCTIONAL ASSESSMENT: PAIN_FUNCTIONAL_ASSESSMENT: ACTIVITIES ARE NOT PREVENTED

## 2022-04-17 NOTE — PLAN OF CARE
Problem: Altered Mood, Depressive Behavior:  Goal: Able to verbalize and/or display a decrease in depressive symptoms  Description: Able to verbalize and/or display a decrease in depressive symptoms  Outcome: Met This Shift  Note: Patient denies having symptoms of depression this shift. Goal: Ability to disclose and discuss suicidal ideas will improve  Description: Ability to disclose and discuss suicidal ideas will improve  Outcome: Met This Shift  Note: Patient denies having suicidal ideation this shift. Goal: Able to verbalize support systems  Description: Able to verbalize support systems  Outcome: Met This Shift  Note: Patient verbalizes her  is her support this shift. Goal: Absence of self-harm  Description: Absence of self-harm  Outcome: Met This Shift  Note: Patient makes no attempt to harm self to this point in the shift. Goal: Patient specific goal  Description: Patient specific goal  Outcome: Met This Shift  Note: Patient sets goal \"to go home\" with writer this shift. Goal: Participates in care planning  Description: Participates in care planning  Outcome: Met This Shift  Note: Patient actively participates in care planning with care team this shift. Problem: Substance Abuse:  Goal: Absence of drug withdrawal signs and symptoms  Description: Absence of drug withdrawal signs and symptoms  Outcome: Met This Shift  Note: No signs or symptoms of alcohol withdrawal noted this shift. Patient refuses librium this shift. Problem: Discharge Planning:  Goal: Discharged to appropriate level of care  Description: Discharged to appropriate level of care  Outcome: Not Met This Shift  Note: Patient not discharged this shift. Patient continues to work with care team toward discharge goal.      Problem: Anxiety:  Goal: Level of anxiety will decrease  Description: Level of anxiety will decrease  Outcome: Met This Shift  Note: Patient denies having anxiety this shift.       Problem: Activity:  Goal: Sleeping patterns will improve  Description: Sleeping patterns will improve  Outcome: Met This Shift  Note: Patient's reported sleep is 6.5 hours continuous. Problem: Coping:  Goal: Ability to identify problematic behaviors that deter socialization will improve  Description: Ability to identify problematic behaviors that deter socialization will improve  Outcome: Met This Shift  Note: Patient out on unit. Interacting well with peers this shift. Problem: Pain:  Goal: Pain level will decrease  Description: Pain level will decrease  Outcome: Met This Shift  Note: Patient denies having pain this shift. Goal: Control of acute pain  Description: Control of acute pain  Outcome: Met This Shift  Note: Patient denies having pain this shift. Goal: Control of chronic pain  Description: Control of chronic pain  Outcome: Met This Shift  Note: Patient denies having pain this shift.

## 2022-04-17 NOTE — BH NOTE
Group Therapy Note    Date: 4/16/2022  Start Time: 2000  End Time:  2020  Number of Participants: 1    Type of Group: Wrap-Up    Wellness Binder Information  Module Name:    Session Number:      Patient's Goal:  To be discharged     Notes: Working on goal    Status After Intervention:  Improved    Participation Level: Active Listener    Participation Quality: Appropriate      Speech:  normal      Thought Process/Content: Logical      Affective Functioning: Congruent      Mood: anxious      Level of consciousness:  Alert      Response to Learning: Able to verbalize current knowledge/experience      Endings: None Reported    Modes of Intervention: Education      Discipline Responsible: Registered Nurse      Signature:   Dana Capellan RN

## 2022-04-17 NOTE — GROUP NOTE
Group Therapy Note    Date: 4/17/2022    Group Start Time: 0930  Group End Time: 1000  Group Topic: [employer] Adult Psych 4E    Marco Antonio Simpson RN        Patient's Goal:  \"See my children\"    Notes: Patient attended community meeting. Unit rules and visitation discussed    Status After Intervention:  Unchanged    Participation Level:  Active Listener and Interactive    Participation Quality: Appropriate      Speech:  normal      Thought Process/Content: Logical      Affective Functioning: Congruent      Mood: euthymic      Level of consciousness:  Alert and Oriented x4      Response to Learning: Able to verbalize current knowledge/experience      Endings: None Reported    Modes of Intervention: Education      Discipline Responsible: Registered Nurse      Signature:  Marco Antonio Simpson RN

## 2022-04-17 NOTE — PLAN OF CARE
Problem: Altered Mood, Depressive Behavior:  Goal: Able to verbalize and/or display a decrease in depressive symptoms  Description: Able to verbalize and/or display a decrease in depressive symptoms  4/16/2022 2306 by Sima James RN  Outcome: Ongoing  Note: Patient denies depression and anxiety, states mood is 7/10 and is looking forward to discharged  4/16/2022 1146 by Luzma Dalal RN  Outcome: Met This Shift  Note: Patient denies having depression this shift. Goal: Ability to disclose and discuss suicidal ideas will improve  Description: Ability to disclose and discuss suicidal ideas will improve  4/16/2022 2306 by Sima James RN  Outcome: Ongoing  Note: Patient denies self harm or suicidal thoughts  4/16/2022 1146 by Luzma Dalal RN  Outcome: Met This Shift  Note: Patient denies having suicidal ideation this shift. Goal: Able to verbalize support systems  Description: Able to verbalize support systems  4/16/2022 2306 by Sima James RN  Outcome: Ongoing  Note: Patient reports and positive support system  4/16/2022 1146 by Luzma Dalal RN  Outcome: Met This Shift  Note: Patient verbalizes her  and kids are her support this shift. Goal: Absence of self-harm  Description: Absence of self-harm  4/16/2022 2306 by Sima James RN  Outcome: Ongoing  Note: Patient denies self harm or suicidal thoughts  4/16/2022 1146 by Luzma Dalal RN  Outcome: Met This Shift  Note: Patient makes no attempt to harm self to this point in the shift. Goal: Patient specific goal  Description: Patient specific goal  4/16/2022 2306 by Sima James RN  Outcome: Ongoing  Note: Patient working on goal to be discharged  4/16/2022 1146 by Luzma Dalal RN  Outcome: Met This Shift  Note: Patient sets goal \"to go home\" with writer this shift.    Goal: Participates in care planning  Description: Participates in care planning  4/16/2022 2306 by Sima James RN  Outcome: Ongoing  Note: Pt is taking medications, problematic behaviors that deter socialization will improve  4/16/2022 2306 by Fabien Vitale RN  Outcome: Ongoing  Note: Patient out on unit with good interaction with peers and staff  4/16/2022 1146 by Tomás Wynn RN  Outcome: Met This Shift  Note: Patient more social. Out on unit playing cards with peers this shift. Problem: Pain:  Goal: Pain level will decrease  Description: Pain level will decrease  4/16/2022 2306 by Fabien Vitale RN  Outcome: Ongoing  Note: Patient denies pain or discomfort  4/16/2022 1146 by Tomás Wynn RN  Outcome: Met This Shift  Note: Patient denies having pain this shift. Goal: Control of acute pain  Description: Control of acute pain  4/16/2022 2306 by Fabien Vitale RN  Outcome: Ongoing  Note: Patient denies pain or discomfort  4/16/2022 1146 by Tomás Wynn RN  Outcome: Met This Shift  Note: Patient denies having pain this shift. Goal: Control of chronic pain  Description: Control of chronic pain  4/16/2022 2306 by Fabien Vitale RN  Outcome: Ongoing  Note: Patient denies pain or discomfort  4/16/2022 1146 by Tomás Wynn RN  Outcome: Met This Shift  Note: Patient denies having pain this shift. Care plan reviewed with patient and verbalize understanding of the plan of care and contribute to goal setting.

## 2022-04-17 NOTE — PROGRESS NOTES
Psychiatry Progress Note                                                  4-     CC: Suicidal gesture by cutting                                                                           Subjective     Progress:  Sunita Quiros reports her mood continues to improve. and depression continues to be  less. Denies feelings of harm towards self or others. Although becomes tearful when advised will not be discharged today. States her hysband was told by Nurse in ED that she would be getting out Sunday morning. . Reports meds are working \"ok\" Denies having side effects. Good med compliance is verified. Reports appetite and sleep continue to improve. Verified slept  6.5 hours continuous. States she attended groups yesterday. Reports  visited yesterday.      Objective  /63   Pulse 76   Temp 97.8 °F (36.6 °C) (Tympanic)   Resp 17   Ht 5' 5\" (1.651 m)   Wt 114 lb (51.7 kg)   LMP 04/06/2022   SpO2 100%   BMI 18.97 kg/m²       MSE:  Level of consciousness: Alert  Appearance: hospital attire, in chair and fair grooming   Behavior/Motor: Tearful when advised would not be discharged today. Attitude toward examiner: cooperative   Speech: Normal volume, circumstantial   Mood: Dysthymic but less  Affect: Reactive  Thought processes: Linear and goal directed   Suicidal Ideation: Denies suicidal ideations  Homicidal ideation: Denies homicidal ideations  Delusions: No evidence of delusions is observed  Perceptual Disturbance: Denies AH/VH;  No evidence of psychosis is observed.   Cognition: Oriented to person, place, time and situation   Concentration fair   Memory intact   Insight: Limited  Judgment: Limited     Assessment:  Depression with suicidal ideation   Alcohol use disorder, severe, dependence  Cannabis use disorder, severe, dependence  Rule out substance induced mood disorder     Plan:  Continue current meds as ordered  Continue to encourage group attendance.        Miky Chan, CNP  3-

## 2022-04-17 NOTE — GROUP NOTE
Group Therapy Note    Date: 4/17/2022    Group Start Time: 1030  Group End Time: 1100  Group Topic: Recreational    [facility]    Kaylie Mei RN        Notes:  Patient attended recreational group. Stressors discussed. Status After Intervention:  Unchanged    Participation Level:  Active Listener and Interactive    Participation Quality: Appropriate and Attentive      Speech:  normal      Thought Process/Content: Logical      Affective Functioning: Flat      Mood: euthymic      Level of consciousness:  Alert and Oriented x4      Response to Learning: Able to verbalize current knowledge/experience      Endings: None Reported    Modes of Intervention: Support, Socialization and Activity      Discipline Responsible: Registered Nurse      Signature:  Kaylie Mei RN

## 2022-04-17 NOTE — PROGRESS NOTES
Per attending direction, Writer speaks with patient's  about family meeting over the telephone at approximately 9 am tomorrow. Patient's  verbalizes he will make himself available at that time.

## 2022-04-17 NOTE — GROUP NOTE
Group Therapy Note    Date: 4/17/2022    Group Start Time: 1100  Group End Time: 4419  Group Topic: Healthy Living/Wellness    [facility]    Shepard Goldmann, RN        Notes:  Patient attended nursing group. Coping skills discussed. Status After Intervention:  Unchanged    Participation Level:  Active Listener and Interactive    Participation Quality: Appropriate and Supportive      Speech:  normal      Thought Process/Content: Logical      Affective Functioning: Flat      Mood: euthymic      Level of consciousness:  Alert and Oriented x4      Response to Learning: Able to verbalize current knowledge/experience      Endings: None Reported    Modes of Intervention: Socialization, Exploration and Activity      Discipline Responsible: Registered Nurse      Signature:  Shepard Goldmann, RN

## 2022-04-18 VITALS
OXYGEN SATURATION: 100 % | TEMPERATURE: 98.1 F | SYSTOLIC BLOOD PRESSURE: 96 MMHG | BODY MASS INDEX: 18.99 KG/M2 | DIASTOLIC BLOOD PRESSURE: 70 MMHG | RESPIRATION RATE: 16 BRPM | HEART RATE: 84 BPM | WEIGHT: 114 LBS | HEIGHT: 65 IN

## 2022-04-18 PROCEDURE — 99239 HOSP IP/OBS DSCHRG MGMT >30: CPT | Performed by: PSYCHIATRY & NEUROLOGY

## 2022-04-18 PROCEDURE — 6370000000 HC RX 637 (ALT 250 FOR IP): Performed by: PHYSICIAN ASSISTANT

## 2022-04-18 PROCEDURE — 5130000000 HC BRIDGE APPOINTMENT

## 2022-04-18 RX ORDER — TRAZODONE HYDROCHLORIDE 50 MG/1
50 TABLET ORAL NIGHTLY PRN
Qty: 30 TABLET | Refills: 0 | Status: SHIPPED | OUTPATIENT
Start: 2022-04-18

## 2022-04-18 RX ORDER — CHLORDIAZEPOXIDE HYDROCHLORIDE 5 MG/1
5 CAPSULE, GELATIN COATED ORAL 3 TIMES DAILY
Status: COMPLETED | OUTPATIENT
Start: 2022-04-18 | End: 2022-04-18

## 2022-04-18 RX ORDER — QUETIAPINE FUMARATE 50 MG/1
50 TABLET, FILM COATED ORAL NIGHTLY
COMMUNITY
Start: 2022-04-01

## 2022-04-18 RX ORDER — QUETIAPINE FUMARATE 50 MG/1
50 TABLET, FILM COATED ORAL NIGHTLY
Qty: 30 TABLET | Refills: 0 | Status: CANCELLED | OUTPATIENT
Start: 2022-04-18

## 2022-04-18 RX ORDER — CITALOPRAM 40 MG/1
40 TABLET ORAL DAILY
Qty: 30 TABLET | Refills: 0 | Status: CANCELLED | OUTPATIENT
Start: 2022-04-18

## 2022-04-18 RX ADMIN — ATOMOXETINE 25 MG: 25 CAPSULE ORAL at 08:27

## 2022-04-18 RX ADMIN — OXYBUTYNIN CHLORIDE 10 MG: 10 TABLET, EXTENDED RELEASE ORAL at 08:27

## 2022-04-18 RX ADMIN — CITALOPRAM 40 MG: 40 TABLET, FILM COATED ORAL at 08:27

## 2022-04-18 RX ADMIN — CHLORDIAZEPOXIDE HYDROCHLORIDE 5 MG: 5 CAPSULE ORAL at 08:27

## 2022-04-18 ASSESSMENT — PAIN SCALES - GENERAL: PAINLEVEL_OUTOF10: 0

## 2022-04-18 NOTE — PLAN OF CARE
Problem: Altered Mood, Depressive Behavior:  Goal: Able to verbalize and/or display a decrease in depressive symptoms  Description: Able to verbalize and/or display a decrease in depressive symptoms  4/18/2022 0007 by Aysha Apple LPN  Outcome: Met This Shift  Note: Patient denies any depressive symptoms at this point in the shift   4/17/2022 1500 by Chapis Roberson RN  Outcome: Met This Shift  Note: Patient denies having symptoms of depression this shift. Goal: Ability to disclose and discuss suicidal ideas will improve  Description: Ability to disclose and discuss suicidal ideas will improve  4/18/2022 0007 by Aysha Apple LPN  Outcome: Met This Shift  Note: Patient denies any suicidal ideas   4/17/2022 1500 by Chapis Roberson RN  Outcome: Met This Shift  Note: Patient denies having suicidal ideation this shift. Goal: Able to verbalize support systems  Description: Able to verbalize support systems  4/18/2022 0007 by Aysha Apple LPN  Outcome: Met This Shift  Note: Patient states that family and friends are her support system   4/17/2022 1500 by Chpais Roberson RN  Outcome: Met This Shift  Note: Patient verbalizes her  is her support this shift. Goal: Absence of self-harm  Description: Absence of self-harm  4/18/2022 0007 by Aysha Apple LPN  Outcome: Met This Shift  Note: Patient free from self harm at this point in the shift and staff is completing 15 minute safety checks  4/17/2022 1500 by Chapis Roberson RN  Outcome: Met This Shift  Note: Patient makes no attempt to harm self to this point in the shift. Goal: Participates in care planning  Description: Participates in care planning  4/18/2022 0007 by yAsha Apple LPN  Outcome: Met This Shift  Note: Patient participates in care planning   4/17/2022 1500 by Chapis Roberson RN  Outcome: Met This Shift  Note: Patient actively participates in care planning with care team this shift.       Problem: Substance Abuse:  Goal: Absence of drug withdrawal signs and symptoms  Description: Absence of drug withdrawal signs and symptoms  4/18/2022 0007 by Riya De Witt, LPN  Outcome: Met This Shift  Note: Patient does not show any signs and symptoms of withdrawal   4/17/2022 1500 by Francia Lowe RN  Outcome: Met This Shift  Note: No signs or symptoms of alcohol withdrawal noted this shift. Patient refuses librium this shift. Problem: Anxiety:  Goal: Level of anxiety will decrease  Description: Level of anxiety will decrease  4/18/2022 0007 by Riya De Witt, LPN  Outcome: Met This Shift  Note: Patient denies any anxiety at this point in the shift   4/17/2022 1500 by Francia Lowe RN  Outcome: Met This Shift  Note: Patient denies having anxiety this shift. Problem: Activity:  Goal: Sleeping patterns will improve  Description: Sleeping patterns will improve  4/18/2022 0007 by Riya De Witt, LPN  Outcome: Met This Shift  Note: Patient says that sleep patterns are improving   4/17/2022 1500 by Francia Lowe RN  Outcome: Met This Shift  Note: Patient's reported sleep is 6.5 hours continuous. Problem: Pain:  Goal: Pain level will decrease  Description: Pain level will decrease  4/18/2022 0007 by Riya De Witt, LPN  Outcome: Met This Shift  Note: Patient denies any pain at this point in the shift   4/17/2022 1500 by Francia Lowe RN  Outcome: Met This Shift  Note: Patient denies having pain this shift. Goal: Control of acute pain  Description: Control of acute pain  4/18/2022 0007 by Riya De Witt, LPN  Outcome: Met This Shift  Note: Patient denies any pain at this point in the shift   4/17/2022 1500 by Francia Lowe RN  Outcome: Met This Shift  Note: Patient denies having pain this shift.    Goal: Control of chronic pain  Description: Control of chronic pain  4/18/2022 0007 by Riya De Witt, LPN  Outcome: Met This Shift  Note: Patient denies any pain at this point in the shift   4/17/2022 1500 by Francia Lowe RN  Outcome: Met This Shift  Note: Patient denies having pain this shift. Problem: Discharge Planning:  Goal: Discharged to appropriate level of care  Description: Discharged to appropriate level of care  4/18/2022 0007 by Dennys Nelson LPN  Outcome: Ongoing  Note: Ongoing   4/17/2022 1500 by Brisa Ohara RN  Outcome: Not Met This Shift  Note: Patient not discharged this shift. Patient continues to work with care team toward discharge goal.      Problem: Coping:  Goal: Ability to identify problematic behaviors that deter socialization will improve  Description: Ability to identify problematic behaviors that deter socialization will improve  4/18/2022 0007 by Dennys Nelson LPN  Outcome: Ongoing  Note: Ongoing   4/17/2022 1500 by Brisa Ohara RN  Outcome: Met This Shift  Note: Patient out on unit. Interacting well with peers this shift. Problem: Altered Mood, Depressive Behavior:  Goal: Patient specific goal  Description: Patient specific goal  4/18/2022 0007 by Dennys Nelson LPN  Outcome: Not Met This Shift  Note: Patient states that goal was to go home and the goal was   4/17/2022 1500 by Brisa Ohara RN  Outcome: Met This Shift  Note: Patient sets goal \"to go home\" with writer this shift. Care plan reviewed with patient. Patient verbalize understanding of the plan of care and contribute to goal setting.

## 2022-04-18 NOTE — BH NOTE
Group Therapy Note    Date: 4/18/2022  Start Time: 2000  End Time:  2030    Type of Group: Wrap-Up    Patient's Goal:  To go home     Notes:  Not met    Status After Intervention:  Improved    Participation Level:  Active Listener and Interactive    Participation Quality: Appropriate, Attentive, Sharing and Supportive      Speech:  normal      Thought Process/Content: Logical      Affective Functioning: Congruent      Mood: anxious and depressed      Level of consciousness:  Alert and Oriented x4      Response to Learning: Able to verbalize current knowledge/experience      Endings: None Reported    Modes of Intervention: Education, Support, Socialization and Exploration      Discipline Responsible: Licensed Practical Nurse      Signature:  Nahomi Robertson LPN

## 2022-04-18 NOTE — BH NOTE
Behavioral Health   Discharge Note    Pt discharged with followings belongings:   Dental Appliances: None  Vision - Corrective Lenses: None  Hearing Aid: None  Jewelry: Ring,Earrings  Body Piercings Removed: N/A  Clothing: Pants  Were All Patient Medications Collected?: Not Applicable  Other Valuables: None   Belongings returned to patient. Patient left department with transport staff via ambulation. Discharged to home. \"An Important Message from Medicare About Your Rights\" (IMM) form photocopy original from admission and provided to pt at least 4 hours prior to discharge N/A. If pt left within 4 hours of receiving 2nd delivery of IMM, this is because pt was agreeable with hospital discharge. Patient/guardian education on aftercare instructions: Yes  Bridge appointment completed:  yes. Reviewed Discharge Instructions with patient/family/nursing facility. Patient/family verbalizes understanding and agreement with the discharge plan using the teachback method.    Information faxed to follow up provider by staff Patient/family verbalize understanding of AVS:Yes    Status EXAM upon discharge:  Status and Exam  Normal: Yes  Facial Expression: Brightened  Affect: Appropriate  Level of Consciousness: Alert  Mood:Normal: Yes  Mood:  (Euthymic)  Motor Activity:Normal: Yes  Motor Activity: Decreased  Interview Behavior: Cooperative  Preception: Vill[place] to Person,[place] to Time,V[place] to Place,[place] to Situation  Attention:Normal: Yes  Attention: Unable to Concentrate  Thought Processes: Circumstantial  Thought Content:Normal: Yes  Hallucinations: None  Delusions: No  Memory:Normal: Yes  Insight and Judgment: No  Insight and Judgment: Poor Judgment,Poor Insight  Present Suicidal Ideation: No  Present Homicidal Ideation: No    Sofi Emmanuel RN
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
## 2022-04-19 NOTE — PROGRESS NOTES
This RN has reviewed and agrees with Steven Beard LPN's data collection and has collaborated with this LPN regarding the patient's care plan.

## 2022-04-30 ASSESSMENT — ENCOUNTER SYMPTOMS
EYE REDNESS: 0
RHINORRHEA: 0
COUGH: 0
NAUSEA: 0
ABDOMINAL PAIN: 0
CHEST TIGHTNESS: 0
BACK PAIN: 0
VOMITING: 0